# Patient Record
Sex: FEMALE | Race: WHITE | NOT HISPANIC OR LATINO | Employment: PART TIME | URBAN - METROPOLITAN AREA
[De-identification: names, ages, dates, MRNs, and addresses within clinical notes are randomized per-mention and may not be internally consistent; named-entity substitution may affect disease eponyms.]

---

## 2017-06-30 LAB — TSH SERPL-ACNC: 1.19 UIU/ML (ref 0.3–5)

## 2017-12-19 ENCOUNTER — TRANSFERRED RECORDS (OUTPATIENT)
Dept: HEALTH INFORMATION MANAGEMENT | Facility: CLINIC | Age: 57
End: 2017-12-19

## 2017-12-19 LAB — TSH SERPL-ACNC: 1.77 UIU/ML (ref 0.3–5)

## 2018-06-16 ENCOUNTER — HEALTH MAINTENANCE LETTER (OUTPATIENT)
Age: 58
End: 2018-06-16

## 2018-08-27 ENCOUNTER — OFFICE VISIT (OUTPATIENT)
Dept: ENDOCRINOLOGY | Facility: CLINIC | Age: 58
End: 2018-08-27
Payer: COMMERCIAL

## 2018-08-27 VITALS
BODY MASS INDEX: 35.5 KG/M2 | HEIGHT: 70 IN | DIASTOLIC BLOOD PRESSURE: 77 MMHG | WEIGHT: 248 LBS | SYSTOLIC BLOOD PRESSURE: 123 MMHG | HEART RATE: 60 BPM

## 2018-08-27 DIAGNOSIS — E66.09 CLASS 2 OBESITY DUE TO EXCESS CALORIES WITHOUT SERIOUS COMORBIDITY WITH BODY MASS INDEX (BMI) OF 35.0 TO 35.9 IN ADULT: ICD-10-CM

## 2018-08-27 DIAGNOSIS — E89.0 POSTSURGICAL HYPOTHYROIDISM: ICD-10-CM

## 2018-08-27 DIAGNOSIS — E22.0 ACROMEGALY (H): Primary | ICD-10-CM

## 2018-08-27 DIAGNOSIS — E66.812 CLASS 2 OBESITY DUE TO EXCESS CALORIES WITHOUT SERIOUS COMORBIDITY WITH BODY MASS INDEX (BMI) OF 35.0 TO 35.9 IN ADULT: ICD-10-CM

## 2018-08-27 PROBLEM — E78.5 HYPERLIPIDEMIA LDL GOAL <130: Status: ACTIVE | Noted: 2018-08-27

## 2018-08-27 LAB
ANION GAP SERPL CALCULATED.3IONS-SCNC: 7 MMOL/L (ref 3–14)
BUN SERPL-MCNC: 13 MG/DL (ref 7–30)
CALCIUM SERPL-MCNC: 9.4 MG/DL (ref 8.5–10.1)
CHLORIDE SERPL-SCNC: 106 MMOL/L (ref 94–109)
CO2 SERPL-SCNC: 28 MMOL/L (ref 20–32)
CORTIS SERPL-MCNC: 5.2 UG/DL (ref 4–22)
CREAT SERPL-MCNC: 0.82 MG/DL (ref 0.52–1.04)
GFR SERPL CREATININE-BSD FRML MDRD: 72 ML/MIN/1.7M2
GLUCOSE SERPL-MCNC: 101 MG/DL (ref 70–99)
POTASSIUM SERPL-SCNC: 4.3 MMOL/L (ref 3.4–5.3)
PROLACTIN SERPL-MCNC: 10 UG/L (ref 3–27)
SODIUM SERPL-SCNC: 141 MMOL/L (ref 133–144)
T4 FREE SERPL-MCNC: 0.89 NG/DL (ref 0.76–1.46)
TSH SERPL DL<=0.005 MIU/L-ACNC: 1.12 MU/L (ref 0.4–4)

## 2018-08-27 PROCEDURE — 84443 ASSAY THYROID STIM HORMONE: CPT | Performed by: INTERNAL MEDICINE

## 2018-08-27 PROCEDURE — 80048 BASIC METABOLIC PNL TOTAL CA: CPT | Performed by: INTERNAL MEDICINE

## 2018-08-27 PROCEDURE — 82533 TOTAL CORTISOL: CPT | Performed by: INTERNAL MEDICINE

## 2018-08-27 PROCEDURE — 99215 OFFICE O/P EST HI 40 MIN: CPT | Performed by: INTERNAL MEDICINE

## 2018-08-27 PROCEDURE — 84146 ASSAY OF PROLACTIN: CPT | Performed by: INTERNAL MEDICINE

## 2018-08-27 PROCEDURE — 84305 ASSAY OF SOMATOMEDIN: CPT | Performed by: INTERNAL MEDICINE

## 2018-08-27 PROCEDURE — 83003 ASSAY GROWTH HORMONE (HGH): CPT | Performed by: INTERNAL MEDICINE

## 2018-08-27 PROCEDURE — 84439 ASSAY OF FREE THYROXINE: CPT | Performed by: INTERNAL MEDICINE

## 2018-08-27 PROCEDURE — 36415 COLL VENOUS BLD VENIPUNCTURE: CPT | Performed by: INTERNAL MEDICINE

## 2018-08-27 RX ORDER — OMEGA-3-ACID ETHYL ESTERS 1 G/1
1 CAPSULE, LIQUID FILLED ORAL DAILY
COMMUNITY

## 2018-08-27 RX ORDER — DESMOPRESSIN ACETATE 0.1 MG/ML
SOLUTION NASAL
Refills: 0 | COMMUNITY
Start: 2018-07-27 | End: 2018-08-31

## 2018-08-27 RX ORDER — MULTIPLE VITAMINS W/ MINERALS TAB 9MG-400MCG
1 TAB ORAL DAILY
COMMUNITY

## 2018-08-27 RX ORDER — DESMOPRESSIN ACETATE 0.1 MG/ML
SOLUTION NASAL
Refills: 0 | Status: CANCELLED | OUTPATIENT
Start: 2018-08-27

## 2018-08-27 RX ORDER — LANREOTIDE ACETATE 120 MG/.5ML
INJECTION SUBCUTANEOUS
COMMUNITY
Start: 2018-08-21 | End: 2018-09-09

## 2018-08-27 RX ORDER — AMLODIPINE BESYLATE 5 MG/1
5 TABLET ORAL
Refills: 1 | COMMUNITY
Start: 2018-08-07

## 2018-08-27 RX ORDER — LANREOTIDE ACETATE 120 MG/.5ML
INJECTION SUBCUTANEOUS
Qty: 0.28 MG | Status: CANCELLED | OUTPATIENT
Start: 2018-08-27

## 2018-08-27 RX ORDER — ESTRADIOL 0.05 MG/D
PATCH, EXTENDED RELEASE TRANSDERMAL
COMMUNITY
Start: 2018-03-01 | End: 2020-06-12

## 2018-08-27 RX ORDER — ESCITALOPRAM OXALATE 10 MG/1
10 TABLET ORAL DAILY
Refills: 3 | COMMUNITY
Start: 2018-08-07

## 2018-08-27 NOTE — PROGRESS NOTES
Name: Anisa Robles is a 58 year old woman, self referred for evaluation of acromegaly     Chief Complaint   Patient presents with     Consult     Pituitary      HPI:  Recent issues:  Here for f/u endocrinology evaluation.  Feeling well overall but stressors with her elderly mother's health  Patient symptoms with some fatigue, knee aching        Pituitary:  1997. Diagnosis of acromegaly  Lab tests showed high IGF-1 level and head MRI scan showed 2 cm pituitary macroadenoma  Details of initial evaluation and testing not available  10/16/97 TSS surgery at [facility] with Dr. Tod Loving/neurosurgery  Devoloped postop diabetes insipidus (DI)  Postop mild persistent elevation with IGF-1 level, normalized with Octreotide treatment  Has taken Somatuline (somatostatin), ddAVP for treatment  Head MRI imaging includes:  2/10/10 MRI:   Postop changes after TSS   No evidence for recurrent tumor  9/24/13 MRI:   Postop changes following pituitary surgery   Enhancing tissues 0.45x 1.3x 1.4 cm   No clear evidence of recurrent tumor  12/19/16 MRI:   Postop changes   Enhancing tissues 0.65x 0.8x ~1.4 cm  Other imaging:  Pelvis XRays:    Osteochondroma bone changes  5/23/14 Abdominal U/S:   GB unremarkable without cholethiasis or GB wall thickening  3/2010 Colonoscopy:   Polyp removed, benign tissue  3/2015 Colonoscopy:   No polyps reported    Current medications include:   Somatuline 120 mg subcutaneous injections    Previously on Q2 mo cycle    5/2016 Changed to Q3 mo cycle dosing, med shipped directly to [facility]    Last injection was 5/30/18   ddAVP 1-spray each morning and 0-1 sprays each evening   Vivelle estrogen patch weekly per gynecologist    Previous history of JOANN/BSO 10/2007      Thyroid:  History of multinodular goiter  7/2005. Left thyroid lobectomy, [facility]  Path:  Benign adenomas  No evidence for postop hypothyroidism  No previous treatment with thyroid  medication  12/19/17 labs:  IGF1 210 ng/mL (nl ), GH 0.9 ng/mL (nl <7.1), glucose 93 mg/dl, prolactin 6.0 ng/mL, TSH 1.77 uIU/mL, FT4 0.95 ng/dL  3/2/18 labs:  T.chol 251 mg/dl,  mg/dl, TG 86 mg/dl      , lives in [place], 2 children Taj and Olivia.  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 Tod Izaguirre/[facility] for general medicine evaluations.  Also sees Dr. Lucina Bales and also Lucina NAPOLES/[facility]    PMH/PSH:  Past Medical History:   Diagnosis Date     Acromegaly (H)      Breast lump      Cataract      Diabetes insipidus (H)      Kidney infection      Multiple thyroid nodules      TOBY (obstructive sleep apnea)      Osteochondroma      Past Surgical History:   Procedure Laterality Date     HYSTERECTOMY TOTAL ABDOMINAL  10/2007     PARTIAL THYROIDECTOMY Left 07/2005       Family Hx:  No family history on file.      Social Hx:  Social History     Social History     Marital status:      Spouse name: N/A     Number of children: N/A     Years of education: N/A     Occupational History     Not on file.     Social History Main Topics     Smoking status: Never Smoker     Smokeless tobacco: Never Used     Alcohol use Yes     Drug use: No     Sexual activity: Not on file     Other Topics Concern     Not on file     Social History Narrative     No narrative on file          MEDICATIONS:  has a current medication list which includes the following prescription(s): amlodipine, desmopressin, escitalopram, estradiol, multivitamin, therapeutic with minerals, omega-3 acid ethyl esters, somatuline depot, and cholecalciferol.    ROS:     ROS: 10 point ROS neg other than the symptoms noted above in the HPI.    GENERAL:  fatigue, wt stable; denies fevers, chills, night sweats.    HEENT: no dysphagia, odonophagia, diplopia, neck pain  THYROID:  no apparent hyper or hypothyroid symptoms  CV: no chest pain, pressure, palpitations  LUNGS: no  "SOB, HOPPER, cough, wheezing   ABDOMEN: no diarrhea, constipation, abdominal pain  EXTREMITIES: no rashes, ulcers, edema  NEUROLOGY: no headaches, denies changes in vision, tingling, extremitiy numbness   MSK: aches at knees and back; denies muscle weakness  SKIN: no rashes or lesions  :  Increased urination if no ddAVP use; no menses since JOANN/BSO 10/2007  PSYCH:  stable mood, no significant anxiety or depression  ENDOCRINE: no heat or cold intolerance    Physical Exam   VS: /77  Pulse 60  Ht 1.765 m (5' 9.5\")  Wt 112.5 kg (248 lb)  BMI 36.1 kg/m2  GENERAL: AXOX3, NAD, well dressed, answering questions appropriately, appears stated age.  THYROID:  Horizontal anterior neck scar healed, no palpable thyroid or neck nodules  HEENT: prominent facial bone features; neck non-tender, no exopthalmous, no proptosis, EOMI  CV: RRR, no rubs, gallops, no murmurs  LUNGS: CTAB, no wheezes, rales, or ronchi  ABDOMEN: obese, soft, nontender, nondistended  EXTREMITIES: large hands and feet, no pedal edema  NEUROLOGY: CN grossly intact, no tremors  MSK: grossly intact  SKIN: no rashes, no lesions    LABS:    All pertinent notes, labs, and images personally reviewed by me.     A/P:  Encounter Diagnoses   Name Primary?     Acromegaly (H) Yes     Postsurgical hypothyroidism      Class 2 obesity due to excess calories without serious comorbidity with body mass index (BMI) of 35.0 to 35.9 in adult      Comments:   Reviewed health history, pituitary, and thyroid issues.    Plan:  Discussed general issues with acromegaly diagnosis and management  We have reviewed pituitary gland anatomy and hormone physiology  Discussed lab tests used to assess patient pituitary-axis hormone levels  We discussed the previous pituitary MRI imaging procedure  Reviewed treatment options with the somatostatin and ddAVP medication use  Reviewed lab testing to screen for hypopituitarism and hypothyroidism    Recommend:  Reviewed the pituitary disease " management plan  Continue current ddAVP spray BID dosing  Check lab tests today including IGF-1, GH, and thyroid levels  Arrange the usual Somatuline injection dose at [facility] for the 3-month injection dose  Will need updated Somatuline and ddAVP med Rx's soon  No head MRI imaging needed at this time.    She had recent high cholesterol levels measured by her gynecologist  Encouraged patient to contact her GYN or PCP for management advice, statin Rx, or fasting lipid panel recheck    Addressed patient questions today    Labs ordered today:   Orders Placed This Encounter   Procedures     Cortisol     Prolactin     Insulin Growth Factor 1 by Immunoassay     Human growth hormone     TSH     T4 free     Basic metabolic panel     Radiology/Consults ordered today: None    More than 50% of the time spent with Ms. Robles on counseling / coordinating her care.  Total appointment time was 40 minutes.    Follow-up:  6 mo    Adithya De Luna MD  Endocrinology  [facility]  Copy:  Zina SANTIAGO

## 2018-08-27 NOTE — MR AVS SNAPSHOT
"              After Visit Summary   8/27/2018    Anisa Robles    MRN: [id number]           Patient Information     Date Of Birth          1960        Visit Information        Provider Department      8/27/2018 10:30 AM Adithya De Luna MD [facility]        Today's Diagnoses     Acromegaly (H)    -  1    Postsurgical hypothyroidism        Class 2 obesity due to excess calories without serious comorbidity with body mass index (BMI) of 35.0 to 35.9 in adult           Follow-ups after your visit        Follow-up notes from your care team     Return in about 6 months (around 2/27/2019).      Who to contact     If you have questions or need follow up information about today's clinic visit or your schedule please contact [facility] directly at [phone].  Normal or non-critical lab and imaging results will be communicated to you by MyChart, letter or phone within 4 business days after the clinic has received the results. If you do not hear from us within 7 days, please contact the clinic through MyChart or phone. If you have a critical or abnormal lab result, we will notify you by phone as soon as possible.  Submit refill requests through [employer] or call your pharmacy and they will forward the refill request to us. Please allow 3 business days for your refill to be completed.          Additional Information About Your Visit        Care EveryWhere ID     This is your Care EveryWhere ID. This could be used by other organizations to access your [facility] medical records  [id number]        Your Vitals Were     Pulse Height BMI (Body Mass Index)             60 1.765 m (5' 9.5\") 36.1 kg/m2          Blood Pressure from Last 3 Encounters:   08/27/18 123/77    Weight from Last 3 Encounters:   08/27/18 112.5 kg (248 lb)              We Performed the Following     Basic metabolic panel     Cortisol     Human growth hormone     Insulin Growth Factor 1 by Immunoassay     Prolactin     T4 free     " [facility]        Primary Care Provider Office Phone # Fax #    Tdo Izaguirre [phone]       [address] [address]        Equal Access to Services     ABDIRASHID GARDINER : Hadii aad ku hadleesao Solbali, waaxda luqadaha, qaybta kaalmada adeegyada, emily rangelin hayaafaizan macdonaldmadelin desouza fab joe. So [facility] [phone].    ATENCIÓN: Si habla español, tiene a graham disposición servicios gratuitos de asistencia lingüística. Llame al [phone].    We comply with applicable federal civil rights laws and [place] laws. We do not discriminate on the basis of race, color, national origin, age, disability, sex, sexual orientation, or gender identity.            Thank you!     Thank you for choosing [facility]  for your care. Our goal is always to provide you with excellent care. Hearing back from our patients is one way we can continue to improve our services. Please take a few minutes to complete the written survey that you may receive in the mail after your visit with us. Thank you!             Your Updated Medication List - Protect others around you: Learn how to safely use, store and throw away your medicines at www.disposemymeds.org.          This list is accurate as of 8/27/18 11:09 AM.  Always use your most recent med list.                   Brand Name Dispense Instructions for use Diagnosis    amLODIPine 10 MG tablet    NORVASC     TAKE 1 TABLET BY MOUTH DAILY  DUE FOR OFFICE VISIT IN SEPT        desmopressin 0.01 % Soln spray    DDAVP     USE ONE SPRAY NASALLY TWO TIMES A DAY AS DIRECTED        escitalopram 10 MG tablet    LEXAPRO     Take 10 mg by mouth daily        estradiol 0.05 MG/24HR BIW patch    VIVELLE-DOT          Multi-vitamin Tabs tablet      Take 1 tablet by mouth daily        omega-3 acid ethyl esters 1 g capsule    Lovaza     Take 1 g by mouth daily        SOMATULINE DEPOT 120 MG/0.5ML Soln injection   Generic drug:  lanreotide Acetate           VITAMIN D  (CHOLECALCIFEROL) PO      Take 1,000 Units by mouth daily

## 2018-08-28 LAB
GH SERPL-MCNC: 0.7 UG/L
IGF-I BLD-MCNC: 234 NG/ML (ref 48–235)

## 2018-08-31 DIAGNOSIS — E23.2 DI (DIABETES INSIPIDUS) (H): Primary | ICD-10-CM

## 2018-08-31 NOTE — TELEPHONE ENCOUNTER
Needs diagnosis  Medication historical in chart    Pending Prescriptions:                       Disp   Refills    desmopressin (DDAVP) 0.01 % SOLN spray    5 mL   6            Sig: USE ONE SPRAY NASALLY TWO TIMES A DAY AS DIRECTED          Last Written Prescription Date:  historical  Last Fill Quantity: 5mL,   # refills: -  Last Office Visit: 8-27-18 TL  Future Office visit:       Routing refill request to provider for review/approval because:  Medication is reported/historical    RT Jose (R)

## 2018-09-02 RX ORDER — DESMOPRESSIN ACETATE 0.1 MG/ML
SOLUTION NASAL
Qty: 5 ML | Refills: 11 | Status: SHIPPED | OUTPATIENT
Start: 2018-09-02 | End: 2019-09-09

## 2018-09-09 DIAGNOSIS — E22.0 ACROMEGALY (H): Primary | ICD-10-CM

## 2018-09-09 RX ORDER — LANREOTIDE ACETATE 120 MG/.5ML
INJECTION SUBCUTANEOUS
Qty: 120 MG | Refills: 2 | Status: SHIPPED | OUTPATIENT
Start: 2018-09-09 | End: 2019-04-03

## 2018-11-20 ENCOUNTER — TELEPHONE (OUTPATIENT)
Dept: ENDOCRINOLOGY | Facility: CLINIC | Age: 58
End: 2018-11-20

## 2019-03-29 DIAGNOSIS — E22.0 ACROMEGALY (H): ICD-10-CM

## 2019-03-29 NOTE — TELEPHONE ENCOUNTER
Reason for Call:  Medication or medication refill:    Do you use a [facility]?  Name of the pharmacy and phone number for the current request:    [facility]  Ph: [phone]    [facility] [address]   Ph: [phone]    Name of the medication requested: SOMATULINE DEPOT 120 MG/0.5ML SOLN injection     Other request: Pt is hoping to get this injected ASAP at the clinic listed above    Can we leave a detailed message on this number? YES    Phone number patient can be reached at: Cell number on file:    Telephone Information:   Mobile [phone]       Best Time: after 3pm      Call taken on 3/29/2019 at 2:33 PM by Nicole Mccullough      somatline  somatulin

## 2019-04-03 DIAGNOSIS — E22.0 ACROMEGALY (H): ICD-10-CM

## 2019-04-03 RX ORDER — LANREOTIDE ACETATE 120 MG/.5ML
INJECTION SUBCUTANEOUS
Qty: 120 MG | Refills: 2 | Status: SHIPPED | OUTPATIENT
Start: 2019-04-03 | End: 2020-02-28

## 2019-04-03 RX ORDER — LANREOTIDE ACETATE 120 MG/.5ML
INJECTION SUBCUTANEOUS
Qty: 120 MG | Refills: 2 | Status: CANCELLED | OUTPATIENT
Start: 2019-04-03

## 2019-04-03 NOTE — TELEPHONE ENCOUNTER
Patient returning call with fax number for pharmacy.     [facility]     Telephone: [phone]  Fax: [phone]

## 2019-04-03 NOTE — TELEPHONE ENCOUNTER
Patient called to check on the message she placed on 3/29/19 and has not heard back.  Regarding her SOMATULINE DEPOT 120 MG/0.5ML SOLIN injection    Please call pt back with an update

## 2019-04-04 NOTE — TELEPHONE ENCOUNTER
Messages noted.  I never received the original 3/29/19 message, though did receive the Somatuline Rx message today.  I appreciate assistance of our MA to clarify the pharmacy used for the Somatuline Rx.      This Somatuline Rx now efaxed to the [facility], as requested.  I assume patient will contact me if other questions or concerns.    JOSEFINA De Luna MD  Endocrinology   [facility]

## 2019-04-05 ENCOUNTER — OFFICE VISIT (OUTPATIENT)
Dept: ENDOCRINOLOGY | Facility: CLINIC | Age: 59
End: 2019-04-05
Payer: COMMERCIAL

## 2019-04-05 VITALS
WEIGHT: 248 LBS | BODY MASS INDEX: 35.5 KG/M2 | HEIGHT: 70 IN | DIASTOLIC BLOOD PRESSURE: 74 MMHG | SYSTOLIC BLOOD PRESSURE: 123 MMHG | HEART RATE: 57 BPM

## 2019-04-05 DIAGNOSIS — E22.0 ACROMEGALY (H): Primary | ICD-10-CM

## 2019-04-05 DIAGNOSIS — E89.0 POSTSURGICAL HYPOTHYROIDISM: ICD-10-CM

## 2019-04-05 DIAGNOSIS — E66.812 CLASS 2 OBESITY DUE TO EXCESS CALORIES WITHOUT SERIOUS COMORBIDITY WITH BODY MASS INDEX (BMI) OF 35.0 TO 35.9 IN ADULT: ICD-10-CM

## 2019-04-05 DIAGNOSIS — E23.2 DI (DIABETES INSIPIDUS) (H): ICD-10-CM

## 2019-04-05 DIAGNOSIS — E66.09 CLASS 2 OBESITY DUE TO EXCESS CALORIES WITHOUT SERIOUS COMORBIDITY WITH BODY MASS INDEX (BMI) OF 35.0 TO 35.9 IN ADULT: ICD-10-CM

## 2019-04-05 LAB
ANION GAP SERPL CALCULATED.3IONS-SCNC: 7 MMOL/L (ref 3–14)
BUN SERPL-MCNC: 12 MG/DL (ref 7–30)
CALCIUM SERPL-MCNC: 10 MG/DL (ref 8.5–10.1)
CHLORIDE SERPL-SCNC: 108 MMOL/L (ref 94–109)
CO2 SERPL-SCNC: 28 MMOL/L (ref 20–32)
CORTIS SERPL-MCNC: 6.7 UG/DL (ref 4–22)
CREAT SERPL-MCNC: 0.82 MG/DL (ref 0.52–1.04)
GFR SERPL CREATININE-BSD FRML MDRD: 79 ML/MIN/{1.73_M2}
GH SERPL-MCNC: 0.5 UG/L
GLUCOSE SERPL-MCNC: 98 MG/DL (ref 70–99)
IGF-I BLD-MCNC: 256 NG/ML (ref 47–236)
POTASSIUM SERPL-SCNC: 4.3 MMOL/L (ref 3.4–5.3)
PROLACTIN SERPL-MCNC: 8 UG/L (ref 3–27)
SODIUM SERPL-SCNC: 143 MMOL/L (ref 133–144)
T4 FREE SERPL-MCNC: 0.92 NG/DL (ref 0.76–1.46)
TSH SERPL DL<=0.005 MIU/L-ACNC: 1.32 MU/L (ref 0.4–4)

## 2019-04-05 PROCEDURE — 84305 ASSAY OF SOMATOMEDIN: CPT | Performed by: INTERNAL MEDICINE

## 2019-04-05 PROCEDURE — 84146 ASSAY OF PROLACTIN: CPT | Performed by: INTERNAL MEDICINE

## 2019-04-05 PROCEDURE — 36415 COLL VENOUS BLD VENIPUNCTURE: CPT | Performed by: INTERNAL MEDICINE

## 2019-04-05 PROCEDURE — 82533 TOTAL CORTISOL: CPT | Performed by: INTERNAL MEDICINE

## 2019-04-05 PROCEDURE — 84439 ASSAY OF FREE THYROXINE: CPT | Performed by: INTERNAL MEDICINE

## 2019-04-05 PROCEDURE — 84443 ASSAY THYROID STIM HORMONE: CPT | Performed by: INTERNAL MEDICINE

## 2019-04-05 PROCEDURE — 99214 OFFICE O/P EST MOD 30 MIN: CPT | Performed by: INTERNAL MEDICINE

## 2019-04-05 PROCEDURE — 80048 BASIC METABOLIC PNL TOTAL CA: CPT | Performed by: INTERNAL MEDICINE

## 2019-04-05 PROCEDURE — 83003 ASSAY GROWTH HORMONE (HGH): CPT | Performed by: INTERNAL MEDICINE

## 2019-04-05 ASSESSMENT — MIFFLIN-ST. JEOR: SCORE: 1782.23

## 2019-04-05 NOTE — PROGRESS NOTES
Name: Anisa Robles    Chief Complaint   Patient presents with     Endocrine Problem     HPI:  Recent issues:  Here for f/u endocrinology evaluation.  Feeling well overall, some fatigue, knee aching R>L  Her brother  2019 with complications of pancreatic cancer        Pituitary:  . 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 Tod Loving/neurosurgery  10/16/97 TSS pituitary surgery at [facility]    Devoloped postop diabetes insipidus (DI)  Postop mild persistent elevation with IGF-1 level, normalized with Octreotide treatment  Has taken Somatuline (somatostatin), ddAVP for treatment    Head MRI imaging includes:  2/10/10 MRI:   Postop changes after TSS   No evidence for recurrent tumor  13 MRI:   Postop changes following pituitary surgery   Enhancing tissues 0.45x 1.3x 1.4 cm   No clear evidence of recurrent tumor  16 MRI:   Postop changes   Enhancing tissues 0.65x 0.8x ~1.4 cm  Other imaging:  Pelvis XRays:    Osteochondroma bone changes  14 Abdominal U/S:   GB unremarkable without cholethiasis or GB wall thickening  3/2010 Colonoscopy:   Polyp removed, benign tissue  3/2015 Colonoscopy:   No polyps reported    Recent  labs include:  Lab Results   Component Value Date    PROLACTIN 10 2018    HGH 0.7 2018    MARCOS 5.2 2018    TSH 1.12 2018    T4 0.89 2018      Current medications include:   Somatuline 120 mg subcutaneous injections    Previously on Q2 mo cycle    2016 Changed to Q3 mo cycle dosing, med shipped directly to [facility]    Last injection was 18   ddAVP 1-spray each morning and 0-1 sprays each evening   Vivelle estrogen patch weekly per gynecologist    Previous history of JOANN/BSO 10/2007      Thyroid:  History of multinodular goiter  Surgical evaluation at [facility]  7/2005. Left thyroid lobectomy  Path:  Benign adenomas  No evidence for postop hypothyroidism  No previous treatment with thyroid medication  12/19/17 labs:  IGF1 210 ng/mL (nl ), GH 0.9 ng/mL (nl <7.1), glucose 93 mg/dl, prolactin 6.0 ng/mL, TSH 1.77 uIU/mL, FT4 0.95 ng/dL  3/2/18 labs:  T.chol 251 mg/dl,  mg/dl, TG 86 mg/dl  Recent [facility] labs include:  Lab Results   Component Value Date    TSH 1.12 08/27/2018    T4 0.89 08/27/2018         , lives in [place], 2 children Taj and Olivia.  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 Tod Izaguirre/[facility] for general medicine evaluations.  Also sees Dr. Lucina Bales and also Lucina NAPOLES/[facility]    PMH/PSH:  Past Medical History:   Diagnosis Date     Acromegaly (H)      Breast lump      Cataract      Diabetes insipidus (H)      Kidney infection      Multiple thyroid nodules      TOBY (obstructive sleep apnea)      Osteochondroma      Past Surgical History:   Procedure Laterality Date     HYSTERECTOMY TOTAL ABDOMINAL  10/2007     PARTIAL THYROIDECTOMY Left 07/2005       Family Hx:  No family history on file.      Social Hx:  Social History     Socioeconomic History     Marital status:      Spouse name: Not on file     Number of children: Not on file     Years of education: Not on file     Highest education level: Not on file   Occupational History     Not on file   Social Needs     Financial resource strain: Not on file     Food insecurity:     Worry: Not on file     Inability: Not on file     Transportation needs:     Medical: Not on file     Non-medical: Not on file   Tobacco Use     Smoking status: Never Smoker     Smokeless tobacco: Never Used   Substance and Sexual Activity     Alcohol use: Yes     Drug use: No     Sexual activity: Not on file   Lifestyle     Physical activity:     Days per week: Not on file     Minutes per session: Not on file     Stress: Not on  "file   Relationships     Social connections:     Talks on phone: Not on file     Gets together: Not on file     Attends [employer] service: Not on file     Active member of club or organization: Not on file     Attends meetings of clubs or organizations: Not on file     Relationship status: Not on file     Intimate partner violence:     Fear of current or ex partner: Not on file     Emotionally abused: Not on file     Physically abused: Not on file     Forced sexual activity: Not on file   Other Topics Concern     Not on file   Social History Narrative     Not on file          MEDICATIONS:  has a current medication list which includes the following prescription(s): amlodipine, desmopressin, escitalopram, estradiol, multivitamin w/minerals, omega-3 acid ethyl esters, somatuline depot, and cholecalciferol.    ROS:     ROS: 10 point ROS neg other than the symptoms noted above in the HPI.    GENERAL:  fatigue, wt stable; denies fevers, chills, night sweats.    HEENT: no dysphagia, odonophagia, diplopia, neck pain  THYROID:  no apparent hyper or hypothyroid symptoms  CV: no chest pain, pressure, palpitations  LUNGS: no SOB, HOPPER, cough, wheezing   ABDOMEN: no diarrhea, constipation, abdominal pain  EXTREMITIES: no rashes, ulcers, edema  NEUROLOGY: no headaches, denies changes in vision, tingling, extremitiy numbness   MSK: aches at knees and back; denies muscle weakness  SKIN: no rashes or lesions  :  Increased urination if no ddAVP use; no menses since JOANN/BSO 10/2007  PSYCH:  stable mood, no significant anxiety or depression  ENDOCRINE: no heat or cold intolerance    Physical Exam   VS: /74 (BP Location: Right arm, Cuff Size: Adult Large)   Pulse 57   Ht 1.765 m (5' 9.5\")   Wt 112.5 kg (248 lb)   Breastfeeding? No   BMI 36.10 kg/m    GENERAL: AXOX3, NAD, well dressed, answering questions appropriately, appears stated age.  THYROID:  Horizontal anterior neck scar healed, no palpable thyroid or neck " nodules  HEENT: prominent facial bone features; neck non-tender, no exopthalmous, no proptosis, EOMI  CV: RRR, no rubs, gallops, no murmurs  LUNGS: CTAB, no wheezes, rales, or ronchi  ABDOMEN: obese, soft, nontender, nondistended  EXTREMITIES: large hands and feet, no pedal edema  NEUROLOGY: CN grossly intact, no tremors  MSK: grossly intact  SKIN: no rashes, no lesions    LABS:    All pertinent notes, labs, and images personally reviewed by me.     A/P:  Encounter Diagnoses   Name Primary?     Acromegaly (H) Yes     DI (diabetes insipidus) (H)      Postsurgical hypothyroidism      Class 2 obesity due to excess calories without serious comorbidity with body mass index (BMI) of 35.0 to 35.9 in adult      Comments:   Reviewed health history, pituitary, and thyroid issues.    Plan:  Discussed general issues with acromegaly diagnosis and management  We have reviewed pituitary gland anatomy and hormone physiology  Discussed lab tests used to assess patient pituitary-axis hormone levels  We have discussed the previous pituitary MRI imaging procedure  Reviewed treatment options with the somatostatin and ddAVP medication use  Reviewed lab testing to screen for hypopituitarism and hypothyroidism    Recommend:  Reviewed the pituitary disease management plan  Continue current ddAVP spray BID dosing  Check lab tests today including IGF-1, GH, and thyroid levels  Resume the Somatuline injection dose at [facility]    Discussed the recent Somatuline Rx sent to [facility] mail order pharmacy, per request   Message me if this medication not available for dosing at [facility] next week at her appt  No head MRI imaging needed at this time.  Monitor for symptom changes    Keep regular general medicine and GYN medical appointments  Addressed patient questions today    Labs ordered today:   Orders Placed This Encounter   Procedures     Prolactin     Insulin Growth Factor 1 by Immunoassay     Human growth hormone     Cortisol      TSH     T4 free     Basic metabolic panel     Radiology/Consults ordered today: None    More than 50% of the time spent with Ms. Robles on counseling / coordinating her care.  Total appointment time was 30 minutes.    Follow-up:  6 mo    Adithya De Luna MD  Endocrinology  [facility]

## 2019-04-18 ENCOUNTER — TELEPHONE (OUTPATIENT)
Dept: FAMILY MEDICINE | Facility: CLINIC | Age: 59
End: 2019-04-18

## 2019-04-18 ENCOUNTER — TELEPHONE (OUTPATIENT)
Dept: ENDOCRINOLOGY | Facility: CLINIC | Age: 59
End: 2019-04-18

## 2019-04-18 NOTE — TELEPHONE ENCOUNTER
Reason for Call:  Prior Auth     Detailed comments: Spoke with Donnell From [email or web address] pharmacy she is calling to requesting a prior auth for     SOMATULINE DEPOT 120 MG/0.5ML SOLN injection    Ph. [phone]    Fax: [phone]        Call taken on 4/18/2019 at 3:18 PM by Elisa Lawson

## 2019-04-22 NOTE — TELEPHONE ENCOUNTER
Leisa from [facility] pharmacy is calling back regarding the prior auth, please call her at [phone]

## 2019-04-25 NOTE — TELEPHONE ENCOUNTER
Messages noted.  I called [facility] pharmacy today and they said we needed to do another Prior Authorization for the Somatuline medication... The previous one .  I tried to explain that this patient has been on Somatuline injections every 2-months for several years, though was told a new PA needed.    Somatuline Depot (lanreotide) is a synthetic form of somatostatin which inhibits growth hormone secretion and is an effective medication for management of this patient's acromegaly disease.  It can be dosed with deep subcutaneous injections monthly or every other month.  It has controlled her IGF-1 and growth hormone levels and is medically necessary.    I will request a new PA for this patient's medication.  [facility] Fax [phone].    JOSEFINA De Luna MD  Endocrinology  [facility]

## 2019-04-29 ENCOUNTER — TELEPHONE (OUTPATIENT)
Dept: ENDOCRINOLOGY | Facility: CLINIC | Age: 59
End: 2019-04-29

## 2019-04-29 NOTE — TELEPHONE ENCOUNTER
Updated [employer] that prior authorization is still in progress. Will update them once we hear back from [facility].    Dariel Raymundo CMA on 4/29/2019 at 12:41 PM

## 2019-04-29 NOTE — TELEPHONE ENCOUNTER
Reason for Call:  Other Regarding Prior Authorization for medication.     Detailed comments: [employer] called regarding status of prior auth for SOMATULINE DEPOT 120 MG/0.5ML SOLN injection    Phone Number Patient can be reached at: Other phone number: [phone]    Best Time: Anytime     Can we leave a detailed message on this number? YES    Call taken on 4/29/2019 at 9:15 AM by Matthew Granados

## 2019-08-13 ENCOUNTER — TELEPHONE (OUTPATIENT)
Dept: ENDOCRINOLOGY | Facility: CLINIC | Age: 59
End: 2019-08-13

## 2019-08-13 NOTE — TELEPHONE ENCOUNTER
Reason for Call:  Medication or medication refill:    Do you use a [facility]?  Name of the pharmacy and phone number for the current request:  NA    Name of the medication requested: SOMATULINE DEPOT 120 MG/0.5ML SOLN injection     Other request: Alfreda Montilla called trying to get Pt re-enrolled in copay assistance program. Unsure who to connect with. Forms were faxed (fax number confirmed)    Can we leave a detailed message on this number? Not Applicable    Phone number Alfreda Masterss can be reached at: [phone]    Best Time: any    Call taken on 8/13/2019 at 12:36 PM by Nicole Mccullough

## 2019-09-06 ENCOUNTER — TRANSFERRED RECORDS (OUTPATIENT)
Dept: HEALTH INFORMATION MANAGEMENT | Facility: CLINIC | Age: 59
End: 2019-09-06

## 2019-10-11 ENCOUNTER — OFFICE VISIT (OUTPATIENT)
Dept: ENDOCRINOLOGY | Facility: CLINIC | Age: 59
End: 2019-10-11
Payer: COMMERCIAL

## 2019-10-11 VITALS
BODY MASS INDEX: 35.13 KG/M2 | SYSTOLIC BLOOD PRESSURE: 117 MMHG | HEART RATE: 57 BPM | WEIGHT: 245.4 LBS | DIASTOLIC BLOOD PRESSURE: 75 MMHG | HEIGHT: 70 IN

## 2019-10-11 DIAGNOSIS — E66.01 MORBID OBESITY (H): ICD-10-CM

## 2019-10-11 DIAGNOSIS — E89.0 HISTORY OF PARTIAL THYROIDECTOMY: ICD-10-CM

## 2019-10-11 DIAGNOSIS — E22.0 ACROMEGALY (H): Primary | ICD-10-CM

## 2019-10-11 LAB
CORTIS SERPL-MCNC: 4.8 UG/DL (ref 4–22)
GLUCOSE SERPL-MCNC: 105 MG/DL (ref 70–99)
PROLACTIN SERPL-MCNC: 8 UG/L (ref 3–27)
TSH SERPL DL<=0.005 MIU/L-ACNC: 1.46 MU/L (ref 0.4–4)

## 2019-10-11 PROCEDURE — 36415 COLL VENOUS BLD VENIPUNCTURE: CPT | Performed by: INTERNAL MEDICINE

## 2019-10-11 PROCEDURE — 83003 ASSAY GROWTH HORMONE (HGH): CPT | Performed by: INTERNAL MEDICINE

## 2019-10-11 PROCEDURE — 84443 ASSAY THYROID STIM HORMONE: CPT | Performed by: INTERNAL MEDICINE

## 2019-10-11 PROCEDURE — 84146 ASSAY OF PROLACTIN: CPT | Performed by: INTERNAL MEDICINE

## 2019-10-11 PROCEDURE — 84305 ASSAY OF SOMATOMEDIN: CPT | Performed by: INTERNAL MEDICINE

## 2019-10-11 PROCEDURE — 82533 TOTAL CORTISOL: CPT | Performed by: INTERNAL MEDICINE

## 2019-10-11 PROCEDURE — 82947 ASSAY GLUCOSE BLOOD QUANT: CPT | Performed by: INTERNAL MEDICINE

## 2019-10-11 PROCEDURE — 99214 OFFICE O/P EST MOD 30 MIN: CPT | Performed by: INTERNAL MEDICINE

## 2019-10-11 ASSESSMENT — MIFFLIN-ST. JEOR: SCORE: 1770.44

## 2019-10-11 NOTE — PROGRESS NOTES
Name: Anisa Robles    Chief Complaint   Patient presents with     Endocrine Problem     acromegaly, D.I.     HPI:  Recent issues:  Here for f/u endocrinology evaluation.  Feeling well overall, less knee aching   Had chiropractic treatments of the right IT band which helped decrease right knee pain          Pituitary:  1997. 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 Tod Loving/neurosurgery  10/16/97 TSS pituitary surgery at [facility]    Devoloped postop diabetes insipidus (DI)  Postop mild persistent elevation with IGF-1 level, normalized with Octreotide treatment  Has taken Somatuline (somatostatin), ddAVP for treatment    Head MRI imaging includes:  2/10/10 MRI:   Postop changes after TSS   No evidence for recurrent tumor  9/24/13 MRI:   Postop changes following pituitary surgery   Enhancing tissues 0.45x 1.3x 1.4 cm   No clear evidence of recurrent tumor  12/19/16 MRI:   Postop changes   Enhancing tissues 0.65x 0.8x ~1.4 cm    Other imaging:  Pelvis XRays:    Osteochondroma bone changes  5/23/14 Abdominal U/S:   GB unremarkable without cholethiasis or GB wall thickening  3/2010 Colonoscopy:   Polyp removed, benign tissue  3/2015 Colonoscopy:   No polyps reported    Recent  labs include:  Lab Results   Component Value Date    PROLACTIN 8 04/05/2019    HGH 0.5 04/05/2019    MARCOS 6.7 04/05/2019    TSH 1.32 04/05/2019    T4 0.92 04/05/2019      Current medications include:   Somatuline 120 mg subcutaneous injections to hip area    Previously on Q2 mo cycle    5/2016 Changed to Q3 mo cycle dosing, med shipped directly to [facility]    Last injection was 9/3/19   ddAVP 1-spray each morning and 0-1 sprays each evening   Vivelle estrogen patch weekly per gynecologist    Previous history of JOANN/BSO 10/2007    Planning to stop the estrogen patch in  late 10/2019      Thyroid:  History of multinodular goiter  Surgical evaluation at [facility]  7/2005. Left thyroid lobectomy  Path:  Benign adenomas  No evidence for postop hypothyroidism  No previous treatment with thyroid medication    12/19/17 labs:  IGF1 210 ng/mL (nl ), GH 0.9 ng/mL (nl <7.1), glucose 93 mg/dl, prolactin 6.0 ng/mL, TSH 1.77 uIU/mL, FT4 0.95 ng/dL  3/2/18 labs:  T.chol 251 mg/dl,  mg/dl, TG 86 mg/dl    Recent [facility] labs include:  Lab Results   Component Value Date    TSH 1.32 04/05/2019    T4 0.92 04/05/2019         , lives in [place], 2 children Taj and Olivia.  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 Tod Izaguirre/[facility] for general medicine evaluations.  Also sees Dr. Lucina Bales and also Lucina NAPOLES/[facility]    PMH/PSH:  Past Medical History:   Diagnosis Date     Acromegaly (H)      Breast lump      Cataract      Diabetes insipidus (H)      Kidney infection      Multiple thyroid nodules      TOBY (obstructive sleep apnea)      Osteochondroma      Past Surgical History:   Procedure Laterality Date     HYSTERECTOMY TOTAL ABDOMINAL  10/2007     PARTIAL THYROIDECTOMY Left 07/2005     PITUITARY SURGERY  10/1997    TSS pituitary surgery       Family Hx:  No family history on file.      Social Hx:  Social History     Socioeconomic History     Marital status:      Spouse name: Not on file     Number of children: Not on file     Years of education: Not on file     Highest education level: Not on file   Occupational History     Not on file   Social Needs     Financial resource strain: Not on file     Food insecurity:     Worry: Not on file     Inability: Not on file     Transportation needs:     Medical: Not on file     Non-medical: Not on file   Tobacco Use     Smoking status: Never Smoker     Smokeless tobacco: Never Used   Substance and Sexual Activity     Alcohol use: Yes     Drug use: No  "    Sexual activity: Not on file   Lifestyle     Physical activity:     Days per week: Not on file     Minutes per session: Not on file     Stress: Not on file   Relationships     Social connections:     Talks on phone: Not on file     Gets together: Not on file     Attends [employer] service: Not on file     Active member of club or organization: Not on file     Attends meetings of clubs or organizations: Not on file     Relationship status: Not on file     Intimate partner violence:     Fear of current or ex partner: Not on file     Emotionally abused: Not on file     Physically abused: Not on file     Forced sexual activity: Not on file   Other Topics Concern     Not on file   Social History Narrative     Not on file          MEDICATIONS:  has a current medication list which includes the following prescription(s): amlodipine, desmopressin, escitalopram, estradiol, multivitamin w/minerals, omega-3 acid ethyl esters, somatuline depot, and cholecalciferol.    ROS:     ROS: 10 point ROS neg other than the symptoms noted above in the HPI.    GENERAL:  fatigue, wt stable; denies fevers, chills, night sweats.    HEENT: no dysphagia, odonophagia, diplopia, neck pain  THYROID:  no apparent hyper or hypothyroid symptoms  CV: no chest pain, pressure, palpitations  LUNGS: no SOB, HOPPER, cough, wheezing   ABDOMEN: no diarrhea, constipation, abdominal pain  EXTREMITIES: no rashes, ulcers, edema  NEUROLOGY: no headaches, denies changes in vision, tingling, extremitiy numbness   MSK: aches at knees and back; denies muscle weakness  SKIN: no rashes or lesions  :  Increased urination if no ddAVP use; no menses since JOANN/BSO 10/2007  PSYCH:  stable mood, no significant anxiety or depression  ENDOCRINE: no heat or cold intolerance    Physical Exam   VS: /75   Pulse 57   Ht 1.765 m (5' 9.5\")   Wt 111.3 kg (245 lb 6.4 oz)   BMI 35.72 kg/m    GENERAL: AXOX3, NAD, well dressed, answering questions appropriately, appears stated " age.  THYROID:  Horizontal anterior neck scar healed, no palpable thyroid or neck nodules  HEENT: prominent facial bone features; neck non-tender, no exopthalmous, no proptosis, EOMI  CV: RRR, no rubs, gallops, no murmurs  LUNGS: CTAB, no wheezes, rales, or ronchi  ABDOMEN: obese, soft, nontender, nondistended  EXTREMITIES: large hands and feet, small subcutaneous nodule at distal phalanx of few fingers; no pedal edema  NEUROLOGY: CN grossly intact, no tremors  MSK: grossly intact  SKIN: no rashes, no lesions    LABS:    All pertinent notes, labs, and images personally reviewed by me.     A/P:  Encounter Diagnoses   Name Primary?     Acromegaly (H) Yes     History of partial thyroidectomy      Morbid obesity (H)      Comments:   Reviewed health history, pituitary, and thyroid issues.    Plan:  Discussed general issues with acromegaly diagnosis and management  We have reviewed pituitary gland anatomy and hormone physiology  Discussed lab tests used to assess patient pituitary-axis hormone levels  We have discussed the previous pituitary MRI imaging procedure  Reviewed treatment options with the somatostatin and ddAVP medication use  Reviewed lab testing to screen for hypopituitarism and hypothyroidism    Recommend:  Reviewed the pituitary disease management plan  Continue current ddAVP spray BID dosing  Check lab tests today including IGF-1, GH, and thyroid levels  Continue the Somatuline 120 mg Q 3 month injection dosing at [facility]   No head MRI imaging needed at this time.  Monitor for symptom changes    Keep regular general medicine and GYN medical appointments  Addressed patient questions today    Labs ordered today:   Orders Placed This Encounter   Procedures     TSH     Prolactin     Insulin Growth Factor 1 by Immunoassay     Human growth hormone     Cortisol     Glucose     Radiology/Consults ordered today: None    More than 50% of the time spent with Mrs. Robles on counseling / coordinating her  care.  Total appointment time was 25 minutes.    Follow-up:  6 mo    VARGAS De Luna MD, MS  Endocrinology  [facility]

## 2019-10-14 LAB — GH SERPL-MCNC: 0.7 UG/L

## 2019-10-15 LAB — IGF-I BLD-MCNC: 308 NG/ML (ref 47–236)

## 2020-03-05 ENCOUNTER — TELEPHONE (OUTPATIENT)
Dept: ENDOCRINOLOGY | Facility: CLINIC | Age: 60
End: 2020-03-05

## 2020-03-05 DIAGNOSIS — E22.0 ACROMEGALY (H): ICD-10-CM

## 2020-03-05 NOTE — TELEPHONE ENCOUNTER
Fax received from [employer] [facility] [employer] wanting clarification on directions    Note from pharmacy:  somatuline depot 120mg is usually injected once monthly, please clarify the directions,including the frequency of injections     Disp Refills Start End UBALDO   SOMATULINE DEPOT 120 MG/0.5ML SOLN injection 120 mg 3 2/28/2020  Yes   Sig: Inject subcutaneous every 3 months, as directed

## 2020-03-06 NOTE — TELEPHONE ENCOUNTER
Patient called to see where this was at. She said at her last appointment it was discussed for her to have it every 8 weeks. She is just waiting for it to be renewed      Ph [phone]

## 2020-03-18 NOTE — TELEPHONE ENCOUNTER
Dr. De Luna-     Can you please confirm recommending dosing of   SOMATULINE DEPOT 120 MG/0.5ML SOLN injection 120      Pt reports dose increased to every 8 weeks at last OV. Notes indicated every 3 months    Please advise,   Thank you,   Gosia ANTONY RN

## 2020-03-21 RX ORDER — LANREOTIDE ACETATE 120 MG/.5ML
INJECTION SUBCUTANEOUS
Qty: 120 MG | Refills: 6 | Status: SHIPPED | OUTPATIENT
Start: 2020-03-21 | End: 2020-06-12

## 2020-03-21 NOTE — TELEPHONE ENCOUNTER
Message noted.  This patient has been on the Somatuline 120 mg injections for several years.  In 2019, she was getting the (deep subcutaneous) injections every 3-months (last injection 12/2019) but I advised a change in routine after the last lab testing... change to injections every 2- months.    I called her today and discussed this topic.  I advised resuming the 2-month injection cycle, have now updated the Somatuline Rx to her [facility] mail order pharmacy, plan next injection at her local [facility] clinic soon and repeat in 5/2020 with this 2-month routine.  She agreed, thanked me for the call.    VARGAS De Luna MD, MS  Endocrinology  [facility]

## 2020-06-12 ENCOUNTER — VIRTUAL VISIT (OUTPATIENT)
Dept: ENDOCRINOLOGY | Facility: CLINIC | Age: 60
End: 2020-06-12
Payer: COMMERCIAL

## 2020-06-12 VITALS — WEIGHT: 245 LBS | BODY MASS INDEX: 36.29 KG/M2 | HEIGHT: 69 IN

## 2020-06-12 DIAGNOSIS — E22.0 ACROMEGALY (H): Primary | ICD-10-CM

## 2020-06-12 DIAGNOSIS — E89.0 HISTORY OF PARTIAL THYROIDECTOMY: ICD-10-CM

## 2020-06-12 DIAGNOSIS — E23.2 DI (DIABETES INSIPIDUS) (H): ICD-10-CM

## 2020-06-12 PROCEDURE — 99213 OFFICE O/P EST LOW 20 MIN: CPT | Mod: TEL | Performed by: INTERNAL MEDICINE

## 2020-06-12 RX ORDER — DESMOPRESSIN ACETATE 0.1 MG/ML
SOLUTION NASAL
Qty: 5 ML | Refills: 11 | Status: SHIPPED | OUTPATIENT
Start: 2020-06-12 | End: 2021-06-28

## 2020-06-12 RX ORDER — LANREOTIDE ACETATE 120 MG/.5ML
INJECTION SUBCUTANEOUS
Qty: 120 MG | Refills: 6 | Status: SHIPPED | OUTPATIENT
Start: 2020-06-12 | End: 2021-02-19

## 2020-06-12 ASSESSMENT — MIFFLIN-ST. JEOR: SCORE: 1755.69

## 2020-06-12 NOTE — PROGRESS NOTES
"Anisa Robles is a 60 year old female who is being evaluated via a billable telephone visit.      The patient has been notified of following:     \"This telephone visit will be conducted via a call between you and your physician/provider. We have found that certain health care needs can be provided without the need for a physical exam.  This service lets us provide the care you need with a short phone conversation.  If a prescription is necessary we can send it directly to your pharmacy.  If lab work is needed we can place an order for that and you can then stop by our lab to have the test done at a later time.    Telephone visits are billed at different rates depending on your insurance coverage. During this emergency period, for some insurers they may be billed the same as an in-person visit.  Please reach out to your insurance provider with any questions.    If during the course of the call the physician/provider feels a telephone visit is not appropriate, you will not be charged for this service.\"    Patient has given verbal consent for Telephone visit?  Yes    What phone number would you like to be contacted at? [phone]    How would you like to obtain your AVS? Reviewed verbally      Recent issues:  Endocrinology followup evaluation.  Discontinued the Vivelle patch per GYN last fall  Feeling well overall, less knee aching           Pituitary:  1997. 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 Tod Loving/neurosurgery  10/16/97 TSS pituitary surgery at [facility]     Devoloped postop diabetes insipidus (DI)  Postop mild persistent elevation with IGF-1 level, normalized with Octreotide treatment  Has taken Somatuline (somatostatin), ddAVP for treatment     Head MRI imaging includes:  2/10/10 MRI:              Postop changes after TSS           "    No evidence for recurrent tumor  9/24/13 MRI:              Postop changes following pituitary surgery              Enhancing tissues 0.45x 1.3x 1.4 cm              No clear evidence of recurrent tumor  12/19/16 MRI:              Postop changes              Enhancing tissues 0.65x 0.8x ~1.4 cm     Other imaging:  Pelvis XRays:               Osteochondroma bone changes  5/23/14 Abdominal U/S:              GB unremarkable without cholethiasis or GB wall thickening  3/2010 Colonoscopy:              Polyp removed, benign tissue  3/2015 Colonoscopy:              No polyps reported     ~10/2019. Vivelle estrogen patch discontinued  Recent [facility] labs include:  Lab Results   Component Value Date    PROLACTIN 8 10/11/2019    HGH 0.7 10/11/2019    MARCOS 4.8 10/11/2019    TSH 1.46 10/11/2019    T4 0.92 04/05/2019        Current medications include:              Somatuline 120 mg subcutaneous injections to hip area, every Q2 mo cycle    Last dose early 6/2020              ddAVP 1-spray each morning and 0-1 sprays each evening        Thyroid:  History of multinodular goiter  Surgical evaluation at [facility]  7/2005. Left thyroid lobectomy  Path:  Benign adenomas  No evidence for postop hypothyroidism  No previous treatment with thyroid medication     12/19/17 labs:  IGF1 210 ng/mL (nl ), GH 0.9 ng/mL (nl <7.1), glucose 93 mg/dl, prolactin 6.0 ng/mL, TSH 1.77 uIU/mL, FT4 0.95 ng/dL  3/2/18 labs:  T.chol 251 mg/dl,  mg/dl, TG 86 mg/dl     Recent [facility] labs include:  Lab Results   Component Value Date    TSH 1.46 10/11/2019    T4 0.92 04/05/2019           , lives in [place], 2 children Taj and Olivia.  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 Tod Izaguirre/[facility] for general medicine evaluations.  Also sees Dr. Lucina Bales and also Lucina NAPOLES/[facility]        ROS: 10 point ROS neg other than the symptoms noted above in the  HPI.     GENERAL:  fatigue, wt stable; denies fevers, chills, night sweats.    HEENT: no dysphagia, odonophagia, diplopia, neck pain  THYROID:  no apparent hyper or hypothyroid symptoms  CV: no chest pain, pressure, palpitations  LUNGS: no SOB, HOPPER, cough, wheezing   ABDOMEN: no diarrhea, constipation, abdominal pain  EXTREMITIES: no rashes, ulcers, edema  NEUROLOGY: no headaches, denies changes in vision, tingling, extremitiy numbness   MSK: aches at knees and back; denies muscle weakness  SKIN: no rashes or lesions  :  Increased urination if no ddAVP use; no menses since JOANN/BSO 10/2007  PSYCH:  stable mood, no significant anxiety or depression  ENDOCRINE: no heat or cold intolerance        LABS:     All pertinent notes, labs, and images personally reviewed by me.      A/P:       Encounter Diagnoses   Name      Acromegaly (H)      History of partial thyroidectomy      Morbid obesity (H)       Comments:   Reviewed health history, pituitary, and thyroid issues.     Plan:  Discussed general issues with acromegaly diagnosis and management  We have reviewed pituitary gland anatomy and hormone physiology  Discussed lab tests used to assess patient pituitary-axis hormone levels  We have discussed the previous pituitary MRI imaging procedure  Reviewed treatment options with the somatostatin and ddAVP medication use  Reviewed lab testing to screen for hypopituitarism and hypothyroidism     Recommend:  Reviewed the pituitary disease management plan  Continue current ddAVP spray BID dosing  Plan repeat [place] lab testing at next appt in 10/2020  Continue the Somatuline 120 mg Q 3 month injection dosing at [facility]   No head MRI imaging needed at this time.  Monitor for symptom changes     Keep regular general medicine and GYN medical appointments  Addressed patient questions today       Total time of call between patient and provider was 16 minutes     This telephone visit chart note is the equivalent of a  39937 office visit billing code      VARGAS De Luna MD, MS  Endocrinology  [facility]

## 2020-08-07 ENCOUNTER — TRANSFERRED RECORDS (OUTPATIENT)
Dept: HEALTH INFORMATION MANAGEMENT | Facility: CLINIC | Age: 60
End: 2020-08-07

## 2020-09-30 ENCOUNTER — TELEPHONE (OUTPATIENT)
Dept: ENDOCRINOLOGY | Facility: CLINIC | Age: 60
End: 2020-09-30

## 2020-09-30 NOTE — TELEPHONE ENCOUNTER
,     Called pt to change appt to virtual.    Pt would like to cancel - says appt was for lab work but since she's not able to come in, she'd rather cancel.    Pt asking for lab orders - willing to drive down to [place] to complete this since she is due.    Please advise.    Thanks!    Ok to leave detailed voice mail.    Vera Luque MA

## 2020-10-01 DIAGNOSIS — E22.0 ACROMEGALY (H): Primary | ICD-10-CM

## 2020-10-01 DIAGNOSIS — E89.0 POSTSURGICAL HYPOTHYROIDISM: ICD-10-CM

## 2020-10-02 DIAGNOSIS — E89.0 POSTSURGICAL HYPOTHYROIDISM: ICD-10-CM

## 2020-10-02 DIAGNOSIS — E22.0 ACROMEGALY (H): ICD-10-CM

## 2020-10-02 LAB
CORTIS SERPL-MCNC: 5.5 UG/DL (ref 4–22)
PROLACTIN SERPL-MCNC: 7 UG/L (ref 3–27)
T4 FREE SERPL-MCNC: 0.93 NG/DL (ref 0.76–1.46)
TSH SERPL DL<=0.005 MIU/L-ACNC: 1.15 MU/L (ref 0.4–4)

## 2020-10-02 PROCEDURE — 82533 TOTAL CORTISOL: CPT | Performed by: INTERNAL MEDICINE

## 2020-10-02 PROCEDURE — 84146 ASSAY OF PROLACTIN: CPT | Performed by: INTERNAL MEDICINE

## 2020-10-02 PROCEDURE — 83003 ASSAY GROWTH HORMONE (HGH): CPT | Performed by: INTERNAL MEDICINE

## 2020-10-02 PROCEDURE — 84305 ASSAY OF SOMATOMEDIN: CPT | Performed by: INTERNAL MEDICINE

## 2020-10-02 PROCEDURE — 36415 COLL VENOUS BLD VENIPUNCTURE: CPT | Performed by: INTERNAL MEDICINE

## 2020-10-02 PROCEDURE — 84443 ASSAY THYROID STIM HORMONE: CPT | Performed by: INTERNAL MEDICINE

## 2020-10-02 PROCEDURE — 84439 ASSAY OF FREE THYROXINE: CPT | Performed by: INTERNAL MEDICINE

## 2020-10-02 NOTE — TELEPHONE ENCOUNTER
Message noted.  OK for patient to arrange a lab appointment at nearest [facility].  Lab orders placed, please relay message.    VARGAS De Luna MD, MS  Endocrinology  [facility]         Never smoker

## 2020-10-06 LAB
GH SERPL-MCNC: 0.5 UG/L
IGF-I BLD-MCNC: 165 NG/ML (ref 46–238)

## 2021-01-12 ENCOUNTER — TRANSFERRED RECORDS (OUTPATIENT)
Dept: HEALTH INFORMATION MANAGEMENT | Facility: CLINIC | Age: 61
End: 2021-01-12

## 2021-02-18 DIAGNOSIS — E22.0 ACROMEGALY (H): ICD-10-CM

## 2021-02-18 NOTE — TELEPHONE ENCOUNTER
SOMATULINE DEPOT 120 MG/0.5ML SOLN injection 120 mg 6 6/12/2020         Last Written Prescription Date:  6/12/2020  Last Fill Quantity: 120 mg,   # refills: 6  Last Office Visit: 6/12/2020  Future Office visit:    Next 5 appointments (look out 90 days)    Apr 09, 2021 10:30 AM  Return Visit with Adithya De Luna MD  [facility] ([facility] [address] [place] [id number] [phone]           Routing refill request to provider for review/approval because:  Drug not on the [facility], [facility] or [facility] refill protocol or controlled substance

## 2021-02-19 RX ORDER — LANREOTIDE ACETATE 120 MG/.5ML
INJECTION SUBCUTANEOUS
Qty: 0.5 ML | Refills: 2 | Status: SHIPPED | OUTPATIENT
Start: 2021-02-19 | End: 2021-09-20

## 2021-05-04 ENCOUNTER — TELEPHONE (OUTPATIENT)
Dept: ENDOCRINOLOGY | Facility: CLINIC | Age: 61
End: 2021-05-04

## 2021-05-04 NOTE — TELEPHONE ENCOUNTER
Specialty mail pharmacy called for a verbal on Somatuline.  Unable to answer all questions, so they will fax form to [phone] as well  [facility] - [address]    Call back if needed:   [phone]

## 2021-06-17 NOTE — TELEPHONE ENCOUNTER
[facility] Call Center    Phone Message    May a detailed message be left on voicemail: yes     Reason for Call: Form or Letter   Type or form/letter needing completion: Prior Authorization  Provider: Calixto  Date form needed: ASAP  Once completed:   Per Shahzad, prior authorization on 5/6/21 was denied due to lack of information, per [facility] pharmacy. Shahzad stated a new PA can be completed on covermymeds.[email or web address] website.  Please complete PA    Action Taken: Message routed to:  Other: Endo    Travel Screening: Not Applicable

## 2021-06-18 NOTE — TELEPHONE ENCOUNTER
Prior Authorization Approval    Authorization Effective Date: 5/19/2021  Authorization Expiration Date: 6/18/2022  Medication: SOMATULINE DEPOT 120 MG/0.5ML SOLN injection  Approved Dose/Quantity: 0.5ml  Reference #: [id number]   Insurance Company: [employer] - Phone [phone] Fax [phone]  Which Pharmacy is filling the prescription (Not needed for infusion/clinic administered): [address]  Pharmacy Notified: Yes **Instructed pharmacy to notify patient when script is ready to /ship.**      Approved with dates 05/19/21-06/18/22, case# [id number]. Will document approval letter once received.

## 2021-06-18 NOTE — TELEPHONE ENCOUNTER
PA Initiation    Medication: SOMATULINE DEPOT 120 MG/0.5ML SOLN injection  Insurance Company: [employer] - Phone [phone] Fax [phone]  Pharmacy Filling the Rx: [facility] [address]  Filling Pharmacy Phone: [phone]  Filling Pharmacy Fax:    Start Date: 6/18/2021    Central Prior Authorization Team   Phone: [phone]      Called [employer] at [phone] to initiate PA over the phone as per covermymeds is coming back stating this has been denied. Was able to answer questions over the phone, and was able to get an approval.

## 2021-06-25 DIAGNOSIS — E23.2 DI (DIABETES INSIPIDUS) (H): ICD-10-CM

## 2021-06-28 RX ORDER — DESMOPRESSIN ACETATE 0.1 MG/ML
SOLUTION NASAL
Qty: 5 ML | Refills: 1 | Status: SHIPPED | OUTPATIENT
Start: 2021-06-28 | End: 2021-09-07

## 2021-09-20 ENCOUNTER — OFFICE VISIT (OUTPATIENT)
Dept: ENDOCRINOLOGY | Facility: CLINIC | Age: 61
End: 2021-09-20
Payer: COMMERCIAL

## 2021-09-20 VITALS
WEIGHT: 238 LBS | BODY MASS INDEX: 35.15 KG/M2 | SYSTOLIC BLOOD PRESSURE: 116 MMHG | DIASTOLIC BLOOD PRESSURE: 77 MMHG | HEART RATE: 68 BPM

## 2021-09-20 DIAGNOSIS — E23.2 DI (DIABETES INSIPIDUS) (H): ICD-10-CM

## 2021-09-20 DIAGNOSIS — E89.0 HISTORY OF PARTIAL THYROIDECTOMY: ICD-10-CM

## 2021-09-20 DIAGNOSIS — E22.0 ACROMEGALY (H): Primary | ICD-10-CM

## 2021-09-20 DIAGNOSIS — L65.9 HAIR THINNING: ICD-10-CM

## 2021-09-20 DIAGNOSIS — E22.0 ACROMEGALY (H): ICD-10-CM

## 2021-09-20 PROCEDURE — 99214 OFFICE O/P EST MOD 30 MIN: CPT | Performed by: INTERNAL MEDICINE

## 2021-09-20 RX ORDER — LANREOTIDE ACETATE 120 MG/.5ML
INJECTION SUBCUTANEOUS
Qty: 0.5 ML | Refills: 6 | Status: SHIPPED | OUTPATIENT
Start: 2021-09-20 | End: 2021-09-25

## 2021-09-20 RX ORDER — DESMOPRESSIN ACETATE 0.1 MG/ML
SOLUTION NASAL
Qty: 5 ML | Refills: 5 | Status: SHIPPED | OUTPATIENT
Start: 2021-09-20 | End: 2022-08-08

## 2021-09-20 NOTE — TELEPHONE ENCOUNTER
Last Written Prescription Date:  2/19/2021  Last Fill Quantity: 0.5mg, # refills: 2   Last office visit:  with prescribing provider:  Dr. De Luna   Future Office Visit: 6/12/2020  Next 5 appointments (look out 90 days)    Sep 20, 2021 11:30 AM  Return Visit with Adithya De Luna MD  [facility] ([facility] ) [address] [place] [address] [id number] [phone]         Requested Prescriptions   Pending Prescriptions Disp Refills     SOMATULINE DEPOT 120 MG/0.5ML SOLN injection [Pharmacy Med Name: SOMATULINE DEP PRFL SYR 0.5ML 120MG] 0.5 mL 6     Sig: INJECT 120 MG UNDER THE SKIN EVERY 2 MONTHS AS DIRECTED       There is no refill protocol information for this order

## 2021-09-20 NOTE — LETTER
9/20/2021         RE: Anisa Robles  [id number] [facility] [address]        Dear Colleague,    Thank you for referring your patient, Anisa Robles, to the [facility] [place]. Please see a copy of my visit note below.      Recent issues:  Endocrinology followup evaluation, last appt 6/2020  Right hip replacement 1/2021, but aching at knees, hips, back areas  2/2021 leg DVT's and bilateral PE's, then Xarelto use for 3 months  7/2021. Vertigo symptoms           Pituitary:  1997. 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 Tod Loving/neurosurgery  10/16/97 TSS pituitary surgery at [facility]     Devoloped postop diabetes insipidus (DI)  Postop mild persistent elevation with IGF-1 level, normalized with Octreotide treatment  Has taken Somatuline (somatostatin), ddAVP for treatment     Head MRI imaging includes:  2/10/10 MRI:              Postop changes after TSS              No evidence for recurrent tumor  9/24/13 MRI:              Postop changes following pituitary surgery              Enhancing tissues 0.45x 1.3x 1.4 cm              No clear evidence of recurrent tumor  12/19/16 MRI:              Postop changes              Enhancing tissues 0.65x 0.8x ~1.4 cm     Other imaging:  Pelvis XRays:               Osteochondroma bone changes  5/23/14 Abdominal U/S:              GB unremarkable without cholethiasis or GB wall thickening  3/2010 Colonoscopy:              Polyp removed, benign tissue  3/2015 Colonoscopy:              No polyps reported     ~10/2019. Vivelle estrogen patch discontinued  Had seen Dr. Tod Izaguirre/[facility]      Previous IGF-1 levels include:  8/27/21 234 ng/mL (nl )  4/5/19  256  10/11/9 308  10/2/20 165    Recent  labs include:  Lab Results   Component Value Date     PROLACTIN 7 10/02/2020    HGH 0.5 10/02/2020    MARCOS 5.5 10/02/2020    TSH 1.15 10/02/2020    T4 0.93 10/02/2020        Current medications include:              Somatuline 120 mg subcutaneous injections to hip area, every Q2 mo cycle    Last dose 7/27/21              ddAVP 1-spray each morning and 0-1 sprays each evening        Thyroid:  History of multinodular goiter  Surgical evaluation at [facility]  7/2005. Left thyroid lobectomy  Path:  Benign adenomas  No evidence for postop hypothyroidism  No previous treatment with thyroid medication    2/24/21 Thyroid U/S:   Right lobe 5.3 x 3.1 x 3 cm and left lobe surgically absent   Several small (<1 cm) nodules throughout right thyroid lobe    12/19/17 labs:  IGF1 210 ng/mL (nl ), GH 0.9 ng/mL (nl <7.1), glucose 93 mg/dl, prolactin 6.0 ng/mL, TSH 1.77 uIU/mL, FT4 0.95 ng/dL     Recent [facility] labs include:  Lab Results   Component Value Date    TSH 1.15 10/02/2020    T4 0.93 10/02/2020           , lives in [place], 2 children Taj and Olivia.  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 IZAIAH Anderson/[facility] for general medicine evaluations.  Also sees Dr. Lucina Bales and also Lucina NAPOLES/[facility]        ROS: 10 point ROS neg other than the symptoms noted above in the HPI.     GENERAL:  fatigue, wt stable; denies fevers, chills, night sweats.    HEENT: no dysphagia, odonophagia, diplopia, neck pain  THYROID:  no apparent hyper or hypothyroid symptoms  CV: no chest pain, pressure, palpitations  LUNGS: no SOB, HOPPER, cough, wheezing   ABDOMEN: no diarrhea, constipation, abdominal pain  EXTREMITIES: no rashes, ulcers, edema  NEUROLOGY: no headaches, denies changes in vision, tingling, extremitiy numbness   MSK: aches at knees and back; denies muscle weakness  SKIN: no rashes or lesions  :  Increased urination if no ddAVP use; no menses since JOANN/BSO 10/2007  PSYCH:  stable mood, no significant  anxiety or depression  ENDOCRINE: no heat or cold intolerance    Physical Exam   VS: /77   Pulse 68   Wt 108 kg (238 lb)   BMI 35.15 kg/m    GENERAL: AXOX3, NAD, well dressed, answering questions appropriately, appears stated age.  ENT: prominent mandible and facial bones; no nose swelling or nasal discharge, mouth redness or gum changes.  EYES: eyes grossly normal to inspection, conjunctivae and sclerae normal, no exophthalmos or proptosis  THYROID:  Low-horizontal anterior neck scar; no apparent nodules or goiter  CV:  RRR without murmurs  LUNGS: no audible wheeze, cough or visible cyanosis, or increased work of breathing  ABDOMEN: abdomen obese size  EXTREMITIES: large hands; no edema noted  NEUROLOGY: CN grossly intact, no tremors  MSK: grossly intact  SKIN:  scalp hair thinning; no apparent skin lesions, rash, or edema with visualized skin appearance  PSYCH: mentation appears normal, affect normal/bright, judgement and insight intact,   normal speech and appearance well groomed     LABS:     All pertinent notes, labs, and images personally reviewed by me.     A/P:  Encounter Diagnoses   Name Primary?     Acromegaly (H) Yes     DI (diabetes insipidus) (H)      History of partial thyroidectomy      Hair thinning      Comments:   Reviewed health history, pituitary, and thyroid issues.  Difficult to understand the Somatuline treatment at her OBGYN clinic without medical records  Reviewed and interpreted tests that I previously ordered.   Ordered appropriate tests for the endocrinology disease management.    Management options discussed and implemented after shared medical decision making with the patient.  Acromegaly problem is chronic-stable     Plan:  Discussed general issues with acromegaly diagnosis and management  We have reviewed pituitary gland anatomy and hormone physiology  Discussed lab tests used to assess patient pituitary-axis hormone levels  We have discussed the previous pituitary MRI  imaging procedure  Reviewed treatment options with the somatostatin and ddAVP medication use  Reviewed lab testing to screen for hypopituitarism and hypothyroidism     Recommend:  Reviewed the pituitary disease management plan  Continue current ddAVP spray BID dosing  Continue the Somatuline 120 mg Q 3 month injection dosing at [facility]   If significant rise in IGF-1 level, change to 120 mg Q 2 months   Needs repeat endocrine lab testing   Recommended nearest [facility], but she prefers [facility] or [facility]   Lab order forms given to patient, diagnoses and our fax number added  No head MRI imaging needed at this time.  Monitor for symptom changes  Will summarize results and recommendations soon     Keep regular general medicine, orthopedic, and GYN appointments  Addressed patient questions today     There are no Patient Instructions on file for this visit.    Future labs ordered today:   Orders Placed This Encounter   Procedures     Testosterone total     Hemoglobin A1c     Basic metabolic panel     TSH     T4 free     Insulin Growth Factor 1 by Immunoassay     Radiology/Consults ordered today: None    Total time spent in with the patient evaluation:  20 min  Additional time spent reviewing pertinent lab tests and chart notes, and documentation:  10 min    Follow-up:  2/2022    VARGAS De Luna MD, MS  Endocrinology  [facility]    CC:  Dr. IZAIAH Anderson/[facility]   Dr. Lucina Bales/[facility]            Again, thank you for allowing me to participate in the care of your patient.        Sincerely,        Adithya De Luna MD

## 2021-09-20 NOTE — PROGRESS NOTES
Recent issues:  Endocrinology followup evaluation, last appt 6/2020  Right hip replacement 1/2021, but aching at knees, hips, back areas  2/2021 leg DVT's and bilateral PE's, then Xarelto use for 3 months  7/2021. Vertigo symptoms           Pituitary:  1997. 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 Tod Loving/neurosurgery  10/16/97 TSS pituitary surgery at [facility]     Devoloped postop diabetes insipidus (DI)  Postop mild persistent elevation with IGF-1 level, normalized with Octreotide treatment  Has taken Somatuline (somatostatin), ddAVP for treatment     Head MRI imaging includes:  2/10/10 MRI:              Postop changes after TSS              No evidence for recurrent tumor  9/24/13 MRI:              Postop changes following pituitary surgery              Enhancing tissues 0.45x 1.3x 1.4 cm              No clear evidence of recurrent tumor  12/19/16 MRI:              Postop changes              Enhancing tissues 0.65x 0.8x ~1.4 cm     Other imaging:  Pelvis XRays:               Osteochondroma bone changes  5/23/14 Abdominal U/S:              GB unremarkable without cholethiasis or GB wall thickening  3/2010 Colonoscopy:              Polyp removed, benign tissue  3/2015 Colonoscopy:              No polyps reported     ~10/2019. Vivelle estrogen patch discontinued  Had seen Dr. Tod Izaguirre/[facility]      Previous IGF-1 levels include:  8/27/21 234 ng/mL (nl )  4/5/19  256  10/11/9 308  10/2/20 165    Recent  labs include:  Lab Results   Component Value Date    PROLACTIN 7 10/02/2020    HGH 0.5 10/02/2020    MARCOS 5.5 10/02/2020    TSH 1.15 10/02/2020    T4 0.93 10/02/2020        Current medications include:              Somatuline 120 mg subcutaneous injections to hip area, every Q2 mo cycle    Last dose 7/27/21               ddAVP 1-spray each morning and 0-1 sprays each evening        Thyroid:  History of multinodular goiter  Surgical evaluation at [facility]  7/2005. Left thyroid lobectomy  Path:  Benign adenomas  No evidence for postop hypothyroidism  No previous treatment with thyroid medication    2/24/21 Thyroid U/S:   Right lobe 5.3 x 3.1 x 3 cm and left lobe surgically absent   Several small (<1 cm) nodules throughout right thyroid lobe    12/19/17 labs:  IGF1 210 ng/mL (nl ), GH 0.9 ng/mL (nl <7.1), glucose 93 mg/dl, prolactin 6.0 ng/mL, TSH 1.77 uIU/mL, FT4 0.95 ng/dL     Recent [facility] labs include:  Lab Results   Component Value Date    TSH 1.15 10/02/2020    T4 0.93 10/02/2020           , lives in [place], 2 children Taj and Olivia.  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 IZAIAH Anderson/[facility] for general medicine evaluations.  Also sees Dr. Lucina Bales and also [facility]        ROS: 10 point ROS neg other than the symptoms noted above in the HPI.     GENERAL:  fatigue, wt stable; denies fevers, chills, night sweats.    HEENT: no dysphagia, odonophagia, diplopia, neck pain  THYROID:  no apparent hyper or hypothyroid symptoms  CV: no chest pain, pressure, palpitations  LUNGS: no SOB, HOPPER, cough, wheezing   ABDOMEN: no diarrhea, constipation, abdominal pain  EXTREMITIES: no rashes, ulcers, edema  NEUROLOGY: no headaches, denies changes in vision, tingling, extremitiy numbness   MSK: aches at knees and back; denies muscle weakness  SKIN: no rashes or lesions  :  Increased urination if no ddAVP use; no menses since JOANN/BSO 10/2007  PSYCH:  stable mood, no significant anxiety or depression  ENDOCRINE: no heat or cold intolerance    Physical Exam   VS: /77   Pulse 68   Wt 108 kg (238 lb)   BMI 35.15 kg/m    GENERAL: AXOX3, NAD, well dressed, answering questions appropriately, appears stated age.  ENT: prominent mandible and  facial bones; no nose swelling or nasal discharge, mouth redness or gum changes.  EYES: eyes grossly normal to inspection, conjunctivae and sclerae normal, no exophthalmos or proptosis  THYROID:  Low-horizontal anterior neck scar; no apparent nodules or goiter  CV:  RRR without murmurs  LUNGS: no audible wheeze, cough or visible cyanosis, or increased work of breathing  ABDOMEN: abdomen obese size  EXTREMITIES: large hands; no edema noted  NEUROLOGY: CN grossly intact, no tremors  MSK: grossly intact  SKIN:  scalp hair thinning; no apparent skin lesions, rash, or edema with visualized skin appearance  PSYCH: mentation appears normal, affect normal/bright, judgement and insight intact,   normal speech and appearance well groomed     LABS:     All pertinent notes, labs, and images personally reviewed by me.     A/P:  Encounter Diagnoses   Name Primary?     Acromegaly (H) Yes     DI (diabetes insipidus) (H)      History of partial thyroidectomy      Hair thinning      Comments:   Reviewed health history, pituitary, and thyroid issues.  Difficult to understand the Somatuline treatment at her OBGYN clinic without medical records  Reviewed and interpreted tests that I previously ordered.   Ordered appropriate tests for the endocrinology disease management.    Management options discussed and implemented after shared medical decision making with the patient.  Acromegaly problem is chronic-stable     Plan:  Discussed general issues with acromegaly diagnosis and management  We have reviewed pituitary gland anatomy and hormone physiology  Discussed lab tests used to assess patient pituitary-axis hormone levels  We have discussed the previous pituitary MRI imaging procedure  Reviewed treatment options with the somatostatin and ddAVP medication use  Reviewed lab testing to screen for hypopituitarism and hypothyroidism     Recommend:  Reviewed the pituitary disease management plan  Continue current ddAVP spray BID  dosing  Continue the Somatuline 120 mg Q 3 month injection dosing at [facility]   If significant rise in IGF-1 level, change to 120 mg Q 2 months   Needs repeat endocrine lab testing   Recommended nearest [facility], but she prefers [facility] or [facility]   Lab order forms given to patient, diagnoses and our fax number added  No head MRI imaging needed at this time.  Monitor for symptom changes  Will summarize results and recommendations soon     Keep regular general medicine, orthopedic, and GYN appointments  Addressed patient questions today     There are no Patient Instructions on file for this visit.    Future labs ordered today:   Orders Placed This Encounter   Procedures     Testosterone total     Hemoglobin A1c     Basic metabolic panel     TSH     T4 free     Insulin Growth Factor 1 by Immunoassay     Radiology/Consults ordered today: None    Total time spent in with the patient evaluation:  20 min  Additional time spent reviewing pertinent lab tests and chart notes, and documentation:  10 min    Follow-up:  2/2022    VARGAS De Luna MD, MS  Endocrinology  [facility]    CC:  Dr. IZAIAH Anderson/[facility]   Dr. Lucina Bales/[facility]

## 2021-09-27 ENCOUNTER — TRANSFERRED RECORDS (OUTPATIENT)
Dept: HEALTH INFORMATION MANAGEMENT | Facility: CLINIC | Age: 61
End: 2021-09-27
Payer: COMMERCIAL

## 2021-09-27 LAB
CREATININE (EXTERNAL): 1.06 MG/DL (ref 0.51–0.95)
GFR ESTIMATED (EXTERNAL): 57 ML/MIN/1.73M2
GLUCOSE (EXTERNAL): 133 MG/DL (ref 74–100)
HBA1C MFR BLD: 6.4 % (ref 4.8–5.6)
POTASSIUM (EXTERNAL): 3.9 MMOL/L (ref 3.5–5.1)

## 2021-11-04 ENCOUNTER — TELEPHONE (OUTPATIENT)
Dept: ENDOCRINOLOGY | Facility: CLINIC | Age: 61
End: 2021-11-04

## 2021-11-04 NOTE — TELEPHONE ENCOUNTER
[facility] Call Center    Phone Message    May a detailed message be left on voicemail: no     Reason for Call: Other: Per Shahzad with [employer] they wanted to advise the care team that they have been unable to speak with the Pt to get the Pt re-enrolled in their program for the Rx: SOMATULINE DEPOT 120 MG/0.5ML SOLN injection. If any questions please call back at: [phone]     Action Taken: Message routed to:  Other: endocrine    Travel Screening: Not Applicable

## 2021-11-05 ENCOUNTER — TELEPHONE (OUTPATIENT)
Dept: ENDOCRINOLOGY | Facility: CLINIC | Age: 61
End: 2021-11-05

## 2021-11-05 NOTE — TELEPHONE ENCOUNTER
[facility]    Phone Message    May a detailed message be left on voicemail: yes     Reason for Call: Requesting Results   Name/type of test: Per pt. She had lab work done on 9/27/21 per [facility]. Per Pt these labs were done through [facility]. Writer is unsure if these were sent out by provider. Writer unable to find any lab results in chart from that date. Pt is requesting that these lab results be mailed to her at:   [id number] [address] [place] [id number]  Please review, and follow up with Pt for questions or if unable to accommodate this request.  Date of test: 9/27/21  Was test done at a location other than [facility] (Please fill in the location if not [facility])?: Yes: [facility]      Action Taken: Message routed to:  Other: endocrine    Travel Screening: Not Applicable

## 2021-11-05 NOTE — TELEPHONE ENCOUNTER
Message noted.  This patient has been enrolled in this [facility] patient assistance program for discounted cost of the Somatuline medication. They need to speak with her directly to get acknowledgement that she wishes to stay in the program.    Please contact patient and ask her to call them (number listed) so she can stay enrolled in program.  Thanks.    VARGAS De Luna MD, MS  Endocrinology  [facility]

## 2022-02-24 ENCOUNTER — OFFICE VISIT (OUTPATIENT)
Dept: ENDOCRINOLOGY | Facility: CLINIC | Age: 62
End: 2022-02-24
Payer: COMMERCIAL

## 2022-02-24 VITALS
WEIGHT: 243.8 LBS | DIASTOLIC BLOOD PRESSURE: 70 MMHG | HEART RATE: 70 BPM | BODY MASS INDEX: 36 KG/M2 | SYSTOLIC BLOOD PRESSURE: 125 MMHG

## 2022-02-24 DIAGNOSIS — E22.0 ACROMEGALY (H): Primary | ICD-10-CM

## 2022-02-24 DIAGNOSIS — E23.2 DI (DIABETES INSIPIDUS) (H): ICD-10-CM

## 2022-02-24 DIAGNOSIS — E89.0 HISTORY OF PARTIAL THYROIDECTOMY: ICD-10-CM

## 2022-02-24 PROCEDURE — 99214 OFFICE O/P EST MOD 30 MIN: CPT | Performed by: INTERNAL MEDICINE

## 2022-02-24 NOTE — PROGRESS NOTES
Recent issues:  Endocrinology followup evaluation  Right hip replacement 1/2021, then left hip LISA 1/13/22  Prior 2/2021 leg DVT's and bilateral PE's, then Xarelto use for 3 months. Restarted Xarelto after recent hip surgery also           Pituitary:  1997. 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 Tod Loving/neurosurgery  10/16/97 TSS pituitary surgery at [facility]     Devoloped postop diabetes insipidus (DI)  Postop mild persistent elevation with IGF-1 level, normalized with Octreotide treatment  Has taken Somatuline (somatostatin), ddAVP for treatment     Head MRI imaging includes:  2/10/10 MRI:              Postop changes after TSS              No evidence for recurrent tumor  9/24/13 MRI:              Postop changes following pituitary surgery              Enhancing tissues 0.45x 1.3x 1.4 cm              No clear evidence of recurrent tumor  12/19/16 MRI:              Postop changes              Enhancing tissues 0.65x 0.8x ~1.4 cm     Other imaging:  Pelvis XRays:               Osteochondroma bone changes  5/23/14 Abdominal U/S:              GB unremarkable without cholethiasis or GB wall thickening  3/2010 Colonoscopy:              Polyp removed, benign tissue  3/2015 Colonoscopy:              No polyps reported     ~10/2019. Vivelle estrogen patch discontinued  Had seen Dr. Tod Izaguirre/[facility]      Previous IGF-1 levels include:  4/5/19  256  10/11/19 308  10/2/20 165  8/27/21 234 ng/mL (nl )    Recent  labs include:  Lab Results   Component Value Date    PROLACTIN 7 10/02/2020    HGH 0.5 10/02/2020    MARCOS 5.5 10/02/2020    TSH 1.15 10/02/2020    T4 0.93 10/02/2020        Current medications include:              Somatuline 120 mg subcutaneous injections to hip area, every Q2 mo cycle    Last doses were 12/2021  then repeated 2/8/22 at [facility]              ddAVP 1-spray each morning and 0-1 sprays each evening        Thyroid:  History of multinodular goiter  Surgical evaluation at [facility]  7/2005. Left thyroid lobectomy  Path:  Benign adenomas  No evidence for postop hypothyroidism  No previous treatment with thyroid medication    2/24/21 Thyroid U/S:   Right lobe 5.3 x 3.1 x 3 cm and left lobe surgically absent   Several small (<1 cm) nodules throughout right thyroid lobe    12/19/17 labs:  IGF1 210 ng/mL (nl ), GH 0.9 ng/mL (nl <7.1), glucose 93 mg/dl, prolactin 6.0 ng/mL, TSH 1.77 uIU/mL, FT4 0.95 ng/dL  9/27/21 labs ([facility]...): 256 ng/mL (nl )  Recent [facility] labs include:  Lab Results   Component Value Date    TSH 1.15 10/02/2020    T4 0.93 10/02/2020           , lives in [place], 2 children Taj and Olivia.  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 IZAIAH Anderson/[facility] for general medicine evaluations.  Also sees Dr. Lucina Bales and also Lucina NAPOLES/[facility]       PMH/PSH:  Past Medical History:   Diagnosis Date     Acromegaly (H)      Breast lump      Cataract      Diabetes insipidus (H)      Kidney infection      Multiple thyroid nodules      TOBY (obstructive sleep apnea)      Osteochondroma      Past Surgical History:   Procedure Laterality Date     HYSTERECTOMY TOTAL ABDOMINAL  10/2007     PARTIAL THYROIDECTOMY Left 07/2005     PITUITARY SURGERY  10/1997    TSS pituitary surgery       Family Hx:  No family history on file.      Social Hx:  Social History     Socioeconomic History     Marital status:      Spouse name: Not on file     Number of children: Not on file     Years of education: Not on file     Highest education level: Not on file   Occupational History     Not on file   Tobacco Use     Smoking status: Never Smoker     Smokeless tobacco: Never Used   Substance and Sexual Activity     Alcohol use: Yes      Drug use: No     Sexual activity: Not on file   Other Topics Concern     Not on file   Social History Narrative     Not on file     Social Determinants of Health     Financial Resource Strain: Not on file   Food Insecurity: Not on file   Transportation Needs: Not on file   Physical Activity: Not on file   Stress: Not on file   Social Connections: Not on file   Intimate Partner Violence: Not on file   Housing Stability: Not on file          MEDICATIONS:  has a current medication list which includes the following prescription(s): amlodipine, desmopressin, escitalopram, multivitamin w/minerals, omega-3 acid ethyl esters, rivaroxaban anticoagulant, somatuline depot, and cholecalciferol.       ROS: 10 point ROS neg other than the symptoms noted above in the HPI.     GENERAL:  fatigue, wt stable; denies fevers, chills, night sweats.    HEENT: no dysphagia, odonophagia, diplopia, neck pain  THYROID:  no apparent hyper or hypothyroid symptoms  CV: no chest pain, pressure, palpitations  LUNGS: no SOB, HOPPER, cough, wheezing   ABDOMEN: no diarrhea, constipation, abdominal pain  EXTREMITIES: no rashes, ulcers, edema  NEUROLOGY: no headaches, denies changes in vision, tingling, extremitiy numbness   MSK: aches at knees and back; denies muscle weakness  SKIN: no rashes or lesions  :  Increased urination if no ddAVP use; no menses since JOANN/BSO 10/2007  PSYCH:  stable mood, no significant anxiety or depression  ENDOCRINE: no heat or cold intolerance    Physical Exam   VS: /70   Pulse 70   Wt 110.6 kg (243 lb 12.8 oz)   BMI 36.00 kg/m    GENERAL: AXOX3, NAD, well dressed, answering questions appropriately, appears stated age.  ENT: prominent mandible and facial bones; no nose swelling or nasal discharge, mouth redness or gum changes.  EYES: eyes grossly normal to inspection, conjunctivae and sclerae normal, no exophthalmos or proptosis  THYROID:  low-horizontal anterior neck scar; no apparent nodules or goiter  CV:   RRR without murmurs  LUNGS: no audible wheeze, cough or visible cyanosis, or increased work of breathing  ABDOMEN: abdomen obese size  EXTREMITIES: large hands; no edema noted  NEUROLOGY: CN grossly intact, no tremors  MSK: grossly intact  SKIN:  scalp hair thinning; no apparent skin lesions, rash, or edema with visualized skin appearance  PSYCH: mentation appears normal, affect normal/bright, judgement and insight intact,   normal speech and appearance well groomed     LABS:     All pertinent notes, labs, and images personally reviewed by me.     A/P:  Encounter Diagnoses   Name Primary?     Acromegaly (H) Yes     DI (diabetes insipidus) (H)      History of partial thyroidectomy        Comments:   Reviewed health history, pituitary, and thyroid issues.  Difficult to understand the Somatuline treatment at her [facility] without medical records  Reviewed and interpreted tests that I previously ordered.   Ordered appropriate tests for the endocrinology disease management.    Management options discussed and implemented after shared medical decision making with the patient.  Acromegaly problem is chronic-stable     Plan:  Discussed general issues with acromegaly diagnosis and management  We have reviewed pituitary gland anatomy and hormone physiology  Discussed lab tests used to assess patient pituitary-axis hormone levels  We have discussed the previous pituitary MRI imaging procedure  Reviewed treatment options with the somatostatin and ddAVP medication use  Reviewed lab testing to screen for hypopituitarism and hypothyroidism     Recommend:  Reviewed the pituitary disease management plan  Continue the Somatuline 120 mg Q 2 month injection dosing at [facility]  Continue current ddAVP spray BID dosing  Needs repeat endocrine lab testing   Recommended nearest [facility] clinic, but she prefers [facility] or [facility]   Lab order forms given to patient, diagnoses and our fax number added  No head MRI  imaging needed at this time.  Monitor for symptom changes  Will summarize results and recommendations soon     Keep regular general medicine, orthopedic, and GYN appointments  Addressed patient questions today     There are no Patient Instructions on file for this visit.    Future labs ordered today:   Orders Placed This Encounter   Procedures     Insulin Growth Factor 1 by Immunoassay       TSH   FT4   BMP   hgbA1c    Radiology/Consults ordered today: None    Total time spent in with the patient evaluation:  23 min  Additional time spent reviewing pertinent lab tests and chart notes, and documentation:  10 min    Follow-up:  8/2022, Return    VARGAS De Luna MD, MS  Endocrinology  [facility]

## 2022-02-24 NOTE — LETTER
2/24/2022         RE: Anisa Robles  [address] [address]        Dear Colleague,    Thank you for referring your patient, Anisa Robles, to the [facility] [place]. Please see a copy of my visit note below.      Recent issues:  Endocrinology followup evaluation  Right hip replacement 1/2021, then left hip LISA 1/13/22  Prior 2/2021 leg DVT's and bilateral PE's, then Xarelto use for 3 months. Restarted Xarelto after recent hip surgery also           Pituitary:  1997. 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 Tod Loving/neurosurgery  10/16/97 TSS pituitary surgery at [facility]     Devoloped postop diabetes insipidus (DI)  Postop mild persistent elevation with IGF-1 level, normalized with Octreotide treatment  Has taken Somatuline (somatostatin), ddAVP for treatment     Head MRI imaging includes:  2/10/10 MRI:              Postop changes after TSS              No evidence for recurrent tumor  9/24/13 MRI:              Postop changes following pituitary surgery              Enhancing tissues 0.45x 1.3x 1.4 cm              No clear evidence of recurrent tumor  12/19/16 MRI:              Postop changes              Enhancing tissues 0.65x 0.8x ~1.4 cm     Other imaging:  Pelvis XRays:               Osteochondroma bone changes  5/23/14 Abdominal U/S:              GB unremarkable without cholethiasis or GB wall thickening  3/2010 Colonoscopy:              Polyp removed, benign tissue  3/2015 Colonoscopy:              No polyps reported     ~10/2019. Vivelle estrogen patch discontinued  Had seen Dr. Tod Izaguirre/[facility]      Previous IGF-1 levels include:  8/27/21 234 ng/mL (nl )  4/5/19  256  10/11/9 308  10/2/20 165    Recent  labs include:  Lab Results   Component Value Date    PROLACTIN  7 10/02/2020    HGH 0.5 10/02/2020    MARCOS 5.5 10/02/2020    TSH 1.15 10/02/2020    T4 0.93 10/02/2020        Current medications include:              Somatuline 120 mg subcutaneous injections to hip area, every Q2 mo cycle    Last doses were 12/2021 then repeated 2/8/22 at [facility]              ddAVP 1-spray each morning and 0-1 sprays each evening        Thyroid:  History of multinodular goiter  Surgical evaluation at [facility]  7/2005. Left thyroid lobectomy  Path:  Benign adenomas  No evidence for postop hypothyroidism  No previous treatment with thyroid medication    2/24/21 Thyroid U/S:   Right lobe 5.3 x 3.1 x 3 cm and left lobe surgically absent   Several small (<1 cm) nodules throughout right thyroid lobe    12/19/17 labs:  IGF1 210 ng/mL (nl ), GH 0.9 ng/mL (nl <7.1), glucose 93 mg/dl, prolactin 6.0 ng/mL, TSH 1.77 uIU/mL, FT4 0.95 ng/dL  9/27/21 labs ([facility]...): 256 ng/mL (nl )  Recent [facility] labs include:  Lab Results   Component Value Date    TSH 1.15 10/02/2020    T4 0.93 10/02/2020           , lives in [place], 2 children Taj and Olivia.  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 IZAIAH Anderson/[facility] for general medicine evaluations.  Also sees Dr. Lucina Bales and also Lucina NAPOLES/[facility]       PMH/PSH:  Past Medical History:   Diagnosis Date     Acromegaly (H)      Breast lump      Cataract      Diabetes insipidus (H)      Kidney infection      Multiple thyroid nodules      TOBY (obstructive sleep apnea)      Osteochondroma      Past Surgical History:   Procedure Laterality Date     HYSTERECTOMY TOTAL ABDOMINAL  10/2007     PARTIAL THYROIDECTOMY Left 07/2005     PITUITARY SURGERY  10/1997    TSS pituitary surgery       Family Hx:  No family history on file.      Social Hx:  Social History     Socioeconomic History     Marital status:      Spouse name: Not on file     Number of children: Not on  file     Years of education: Not on file     Highest education level: Not on file   Occupational History     Not on file   Tobacco Use     Smoking status: Never Smoker     Smokeless tobacco: Never Used   Substance and Sexual Activity     Alcohol use: Yes     Drug use: No     Sexual activity: Not on file   Other Topics Concern     Not on file   Social History Narrative     Not on file     Social Determinants of Health     Financial Resource Strain: Not on file   Food Insecurity: Not on file   Transportation Needs: Not on file   Physical Activity: Not on file   Stress: Not on file   Social Connections: Not on file   Intimate Partner Violence: Not on file   Housing Stability: Not on file          MEDICATIONS:  has a current medication list which includes the following prescription(s): amlodipine, desmopressin, escitalopram, multivitamin w/minerals, omega-3 acid ethyl esters, rivaroxaban anticoagulant, somatuline depot, and cholecalciferol.       ROS: 10 point ROS neg other than the symptoms noted above in the HPI.     GENERAL:  fatigue, wt stable; denies fevers, chills, night sweats.    HEENT: no dysphagia, odonophagia, diplopia, neck pain  THYROID:  no apparent hyper or hypothyroid symptoms  CV: no chest pain, pressure, palpitations  LUNGS: no SOB, HOPPER, cough, wheezing   ABDOMEN: no diarrhea, constipation, abdominal pain  EXTREMITIES: no rashes, ulcers, edema  NEUROLOGY: no headaches, denies changes in vision, tingling, extremitiy numbness   MSK: aches at knees and back; denies muscle weakness  SKIN: no rashes or lesions  :  Increased urination if no ddAVP use; no menses since JOANN/BSO 10/2007  PSYCH:  stable mood, no significant anxiety or depression  ENDOCRINE: no heat or cold intolerance    Physical Exam   VS: /70   Pulse 70   Wt 110.6 kg (243 lb 12.8 oz)   BMI 36.00 kg/m    GENERAL: AXOX3, NAD, well dressed, answering questions appropriately, appears stated age.  ENT: prominent mandible and facial bones;  no nose swelling or nasal discharge, mouth redness or gum changes.  EYES: eyes grossly normal to inspection, conjunctivae and sclerae normal, no exophthalmos or proptosis  THYROID:  low-horizontal anterior neck scar; no apparent nodules or goiter  CV:  RRR without murmurs  LUNGS: no audible wheeze, cough or visible cyanosis, or increased work of breathing  ABDOMEN: abdomen obese size  EXTREMITIES: large hands; no edema noted  NEUROLOGY: CN grossly intact, no tremors  MSK: grossly intact  SKIN:  scalp hair thinning; no apparent skin lesions, rash, or edema with visualized skin appearance  PSYCH: mentation appears normal, affect normal/bright, judgement and insight intact,   normal speech and appearance well groomed     LABS:     All pertinent notes, labs, and images personally reviewed by me.     A/P:  Encounter Diagnoses   Name Primary?     Acromegaly (H) Yes     DI (diabetes insipidus) (H)      History of partial thyroidectomy        Comments:   Reviewed health history, pituitary, and thyroid issues.  Difficult to understand the Somatuline treatment at her [facility] clinic without medical records  Reviewed and interpreted tests that I previously ordered.   Ordered appropriate tests for the endocrinology disease management.    Management options discussed and implemented after shared medical decision making with the patient.  Acromegaly problem is chronic-stable     Plan:  Discussed general issues with acromegaly diagnosis and management  We have reviewed pituitary gland anatomy and hormone physiology  Discussed lab tests used to assess patient pituitary-axis hormone levels  We have discussed the previous pituitary MRI imaging procedure  Reviewed treatment options with the somatostatin and ddAVP medication use  Reviewed lab testing to screen for hypopituitarism and hypothyroidism     Recommend:  Reviewed the pituitary disease management plan  Continue the Somatuline 120 mg Q 2 month injection dosing at [facility]  clinic  Continue current ddAVP spray BID dosing  Needs repeat endocrine lab testing   Recommended nearest [facility] clinic, but she prefers [facility] or [facility]   Lab order forms given to patient, diagnoses and our fax number added  No head MRI imaging needed at this time.  Monitor for symptom changes  Will summarize results and recommendations soon     Keep regular general medicine, orthopedic, and GYN appointments  Addressed patient questions today     There are no Patient Instructions on file for this visit.    Future labs ordered today:   Orders Placed This Encounter   Procedures     Insulin Growth Factor 1 by Immunoassay       TSH   FT4   BMP   hgbA1c    Radiology/Consults ordered today: None    Total time spent in with the patient evaluation:  23 min  Additional time spent reviewing pertinent lab tests and chart notes, and documentation:  10 min    Follow-up:  8/2022, Return    VARGAS De Luna MD, MS  Endocrinology  [facility]                Again, thank you for allowing me to participate in the care of your patient.        Sincerely,        Adithya De Luna MD

## 2022-03-01 ENCOUNTER — TRANSFERRED RECORDS (OUTPATIENT)
Dept: HEALTH INFORMATION MANAGEMENT | Facility: CLINIC | Age: 62
End: 2022-03-01
Payer: COMMERCIAL

## 2022-03-01 LAB
CREATININE (EXTERNAL): 0.81 MG/DL (ref 0.51–0.95)
GFR ESTIMATED (EXTERNAL): >60 ML/MIN/1.73M*2
GLUCOSE (EXTERNAL): 81 MG/DL (ref 74–100)
HBA1C MFR BLD: 5.9 % (ref 4.8–5.6)
POTASSIUM (EXTERNAL): 4 MMOL/L (ref 3.5–5.1)
TSH SERPL-ACNC: 1.42 ULU/ML (ref 0.35–3.74)

## 2022-04-04 ENCOUNTER — TELEPHONE (OUTPATIENT)
Dept: ENDOCRINOLOGY | Facility: CLINIC | Age: 62
End: 2022-04-04
Payer: COMMERCIAL

## 2022-04-04 NOTE — TELEPHONE ENCOUNTER
Reason for Call:  Request for results:    Name of test or procedure: labs    Date of test of procedure: patient did not know date     Location of the test or procedure: [facility] in [place]    OK to leave the result message on voice mail or with a family member? YES    Phone number Patient can be reached at:  [phone]    Additional comments: please call with results.    Call taken on 4/4/2022 at 12:36 PM by Clare Patel

## 2022-04-12 NOTE — TELEPHONE ENCOUNTER
"Message reviewed again.  I have been waiting for the final lab results to be faxed from the [facility] lab, since these (recently done) labs not yet accessible to us in the Epic \"Care Everywhere\" tab.  That lab has now sent all of the results.    These tests from 3/1/22 showed normal (and improved) IGF-1 (growth hormone related test) 192 ng/mL (normal ) while she's been on the Somatuline LAR medication to suppress the growth hormone secretion.      Other results included slightly elevated hgbA1c 5.9% (normal 4.8- 5.6), also normal total testosterone 11 ng/dL (normal 8- 60), free testosterone 0.30 ng/dL (normal 0.06- 0.87), thyroid levels TSH 1.422 uIU/mL (normal 0.35- 3.74) and free T4 0.84 ng/dL (normal 0.76- 1.46), and normal electrolyte panel.    Please relay feedback to patient.  I will also mail her a lab letter (US Mail) with these details.  Thanks.    VARGAS De Luna MD, MS  Endocrinology  [facility]            "

## 2022-06-08 ENCOUNTER — TELEPHONE (OUTPATIENT)
Dept: ENDOCRINOLOGY | Facility: CLINIC | Age: 62
End: 2022-06-08

## 2022-06-08 NOTE — TELEPHONE ENCOUNTER
Prior Authorization Retail Medication Request    Medication/Dose: Somatuline depot 120mg/0.5ml  ICD code (if different than what is on RX):  e22.0      Insurance Name:  [employer]  Insurance ID:  [id number]       Pharmacy Information (if different than what is on RX)  Name:  [facility]  Phone:  Not on form

## 2022-06-13 NOTE — TELEPHONE ENCOUNTER
Prior Authorization Approval    Authorization Effective Date: 5/14/2022  Authorization Expiration Date: 6/13/2023  Medication: Somatuline depot PA INITIATED 6/13/22 PA APPROVED 6/13/22  Approved Dose/Quantity: 0.5ml Every 60 days  Reference #:     Insurance Company: [facility] [employer] - Phone [phone] Fax [phone]  Expected CoPay:       CoPay Card Available:      Foundation Assistance Needed:    Which Pharmacy is filling the prescription (Not needed for infusion/clinic administered): [address]  Pharmacy Notified: Yes  Patient Notified: Comment:  Pharmacy will notify patient.          PA Initiation    Medication: Somatuline depot PA INITIATED 6/13/22  Insurance Company: [employer] Phone [phone] Fax [phone]  Pharmacy Filling the Rx: [address]  Filling Pharmacy Phone: [phone]  Filling Pharmacy Fax:    Start Date: 6/13/2022    Central Prior Authorization Team   Phone: [phone]

## 2022-08-08 DIAGNOSIS — E23.2 DI (DIABETES INSIPIDUS) (H): ICD-10-CM

## 2022-08-08 RX ORDER — DESMOPRESSIN ACETATE 0.1 MG/ML
SOLUTION NASAL
Qty: 5 ML | Refills: 5 | Status: SHIPPED | OUTPATIENT
Start: 2022-08-08 | End: 2023-03-06

## 2022-08-08 NOTE — TELEPHONE ENCOUNTER
Last Written Prescription Date:  9/20/21  Last Fill Quantity: 5,  # refills: 5   Last office visit: 2/24/2022 with prescribing provider:  Dr. De Luna  Future Office Visit:  8/25/22        Requested Prescriptions   Pending Prescriptions Disp Refills     desmopressin (DDAVP) 0.01 % spray [Pharmacy Med Name: DESMOPRESSIN 10 MCG/0.1 ML 0.01 Solution] 5 mL 5     Sig: USE 1 SPRAY TO NOSTRILS TWICE PER DAY AS DIRECTED       There is no refill protocol information for this order        Routing to provider.  Saida Sanders RN

## 2022-08-25 ENCOUNTER — OFFICE VISIT (OUTPATIENT)
Dept: ENDOCRINOLOGY | Facility: CLINIC | Age: 62
End: 2022-08-25
Payer: COMMERCIAL

## 2022-08-25 VITALS
SYSTOLIC BLOOD PRESSURE: 120 MMHG | BODY MASS INDEX: 34.87 KG/M2 | DIASTOLIC BLOOD PRESSURE: 77 MMHG | WEIGHT: 236.1 LBS | HEART RATE: 81 BPM

## 2022-08-25 DIAGNOSIS — R73.03 PREDIABETES: ICD-10-CM

## 2022-08-25 DIAGNOSIS — E89.0 HISTORY OF PARTIAL THYROIDECTOMY: ICD-10-CM

## 2022-08-25 DIAGNOSIS — E23.2 DI (DIABETES INSIPIDUS) (H): ICD-10-CM

## 2022-08-25 DIAGNOSIS — E22.0 ACROMEGALY (H): Primary | ICD-10-CM

## 2022-08-25 PROCEDURE — 99214 OFFICE O/P EST MOD 30 MIN: CPT | Performed by: INTERNAL MEDICINE

## 2022-08-25 NOTE — LETTER
8/25/2022         RE: Anisa Robles  [id number] [address]        Dear Colleague,    Thank you for referring your patient, Anisa Robles, to the [facility] [place]. Please see a copy of my visit note below.      Recent issues:  Endocrinology followup evaluation  Previous right hip replacement 1/2021, then 2/2021 right leg DVT's and bilateral PE's in 2/2021, then Xarelto use for 3 months.   Subsequent left hip LISA 1/13/22  No significant hip pains but low back pain problem, also chronic fatigue  Had [place] ED evaluation 7/2022 for right leg swelling, then f/u with PCP and change in BP med, CT abdomen procedure         Pituitary:  1997. 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 Tod Loving/neurosurgery  10/16/97 TSS pituitary surgery at [facility]     Devoloped postop diabetes insipidus (DI)  Postop mild persistent elevation with IGF-1 level, normalized with Octreotide treatment  Has taken Somatuline (somatostatin), ddAVP for treatment     Head MRI imaging includes:  2/10/10 MRI:              Postop changes after TSS              No evidence for recurrent tumor  9/24/13 MRI:              Postop changes following pituitary surgery              Enhancing tissues 0.45 x 1.3 x 1.4 cm              No clear evidence of recurrent tumor  12/19/16 MRI:              Postop changes              Enhancing tissues 0.65 x 0.8 x ~1.4 cm     Other imaging:  Pelvis XRays:               Osteochondroma bone changes  5/23/14 Abdominal U/S:              GB unremarkable without cholethiasis or GB wall thickening  3/2010 Colonoscopy:              Polyp removed, benign tissue  3/2015 Colonoscopy:              No polyps reported     ~10/2019. Vivelle estrogen patch discontinued  Had seen Dr. Tod Izaguirre/[facility]      Previous IGF-1 levels include:  4/5/19  256  10/11/19 308  10/2/20 165  8/27/21 234 ng/mL (nl )    Recent [facility] labs include:  Lab Results   Component Value Date    PROLACTIN 7 10/02/2020    HGH 0.5 10/02/2020    MARCOS 5.5 10/02/2020    TSH 1.15 10/02/2020    T4 0.93 10/02/2020        Current medications include: Somatuline 120 mg subcutaneous injections to hip area, every Q2 mo cycle      Last dose 7/8/22 at [facility]                 ddAVP 1-spray each morning and 0-1 sprays each evening        Thyroid:  History of multinodular goiter  Surgical evaluation at [facility]  7/2005. Left thyroid lobectomy  Path:  Benign adenomas  No evidence for postop hypothyroidism  No previous treatment with thyroid medication    2/24/21 Thyroid U/S:   Right lobe 5.3 x 3.1 x 3 cm and left lobe surgically absent   Several small (<1 cm) nodules throughout right thyroid lobe    12/19/17 labs:  IGF1 210 ng/mL (nl ), GH 0.9 ng/mL (nl <7.1), glucose 93 mg/dl, prolactin 6.0 ng/mL, TSH 1.77 uIU/mL, FT4 0.95 ng/dL  9/27/21 labs ([facility]...): 256 ng/mL (nl )  Recent [facility] labs include:  Lab Results   Component Value Date    TSH 1.15 10/02/2020    T4 0.93 10/02/2020           , lives in [place], 2 children Taj and Olivia.  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 Saw Anderson/[facility] for general medicine evaluations.  Also sees Dr. Lucina Bales and also Lucina NAPOLES/[facility]       PMH/PSH:  Past Medical History:   Diagnosis Date     Acromegaly (H)      Breast lump      Cataract      Diabetes insipidus (H)      Kidney infection      Multiple thyroid nodules      TOBY (obstructive sleep apnea)      Osteochondroma      Past Surgical History:   Procedure Laterality Date     HYSTERECTOMY TOTAL ABDOMINAL  10/2007     PARTIAL THYROIDECTOMY Left 07/2005     PITUITARY SURGERY  10/1997    TSS pituitary surgery       Family Hx:  No family  history on file.      Social Hx:  Social History     Socioeconomic History     Marital status:      Spouse name: Not on file     Number of children: Not on file     Years of education: Not on file     Highest education level: Not on file   Occupational History     Not on file   Tobacco Use     Smoking status: Never Smoker     Smokeless tobacco: Never Used   Substance and Sexual Activity     Alcohol use: Yes     Drug use: No     Sexual activity: Not on file   Other Topics Concern     Not on file   Social History Narrative     Not on file     Social Determinants of Health     Financial Resource Strain: Not on file   Food Insecurity: Not on file   Transportation Needs: Not on file   Physical Activity: Not on file   Stress: Not on file   Social Connections: Not on file   Intimate Partner Violence: Not on file   Housing Stability: Not on file          MEDICATIONS:  has a current medication list which includes the following prescription(s): amlodipine, desmopressin, escitalopram, multivitamin w/minerals, somatuline depot, omega-3 acid ethyl esters, rivaroxaban anticoagulant, and cholecalciferol.       ROS: 10 point ROS neg other than the symptoms noted above in the HPI.     GENERAL:  fatigue, wt stable; denies fevers, chills, night sweats.    HEENT: no dysphagia, odonophagia, diplopia, neck pain  THYROID:  no apparent hyper or hypothyroid symptoms  CV: no chest pain, pressure, palpitations  LUNGS: no SOB, HOPPER, cough, wheezing   ABDOMEN: no diarrhea, constipation, abdominal pain  EXTREMITIES: right foot/ankle/lower leg edema  NEUROLOGY: no headaches, denies changes in vision, tingling, extremitiy numbness   MSK: aches at knees and back; denies muscle weakness  SKIN: no rashes or lesions  :  increased urination if no ddAVP use; no menses since JOANN/BSO 10/2007  PSYCH:  stable mood, no significant anxiety or depression  ENDOCRINE: no heat or cold intolerance    Physical Exam   VS: /77   Pulse 81   Wt 107.1 kg  (236 lb 1.6 oz)   BMI 34.87 kg/m    GENERAL: AXOX3, NAD, well dressed, answering questions appropriately, appears stated age.  ENT: prominent mandible and facial bones; no nose swelling or nasal discharge, mouth redness or gum changes.  EYES: eyes grossly normal to inspection, conjunctivae and sclerae normal, no exophthalmos or proptosis  THYROID:  low-horizontal anterior neck scar; no apparent nodules or goiter  CV:  RRR without murmurs  LUNGS: no audible wheeze, cough or visible cyanosis, or increased work of breathing  ABDOMEN: abdomen obese size  EXTREMITIES: some right ankle area edema, large hands  NEUROLOGY: CN grossly intact, no tremors  MSK: grossly intact  SKIN:  scalp hair thinning; no apparent skin lesions, rash, or edema with visualized skin appearance  PSYCH: mentation appears normal, affect normal/bright, judgement and insight intact,   normal speech and appearance well groomed     LABS:     All pertinent notes, labs, and images personally reviewed by me.     A/P:  Encounter Diagnoses   Name Primary?     Acromegaly (H) Yes     History of partial thyroidectomy      DI (diabetes insipidus) (H)      Prediabetes        Comments:   Reviewed health history, pituitary, and thyroid issues.  Difficult to understand the Somatuline treatment at her OBGYN clinic without medical records  Persistent right ankle area edema despite her repeat DVT workup, per patient  Reviewed and interpreted tests that I previously ordered.   Ordered appropriate tests for the endocrinology disease management.    Management options discussed and implemented after shared medical decision making with the patient.  Acromegaly problem is chronic-stable     Plan:  Discussed general issues with acromegaly diagnosis and management  We have reviewed pituitary gland anatomy and hormone physiology  Discussed lab tests used to assess patient pituitary-axis hormone levels  We have discussed the previous pituitary MRI imaging procedure  Reviewed  treatment options with the somatostatin and ddAVP medication use  Reviewed lab testing to screen for hypopituitarism and hypothyroidism     Recommend:  Reviewed the pituitary disease management plan  Continue the Somatuline 120 mg Q 2 month injection dosing at [facility]   Plan next dose ~9/8/22 and mid 11/2022   Contact our office if questions about Somatuline dosing or Rx  Continue current ddAVP spray QAM vs BID dosing  Needs repeat endocrine lab testing   Recommended nearest [facility] clinic, but she prefers [facility] or hospitals   Lab order forms given to patient, diagnoses and our fax number added  No head MRI imaging needed at this time.  Monitor for symptom changes  Will summarize results and recommendations when lab results available    Keep regular general medicine, orthopedic, and GYN appointments  Needs further evaluation of the leg edema and/or vascular medicine consultation  Addressed patient questions today     There are no Patient Instructions on file for this visit.    Future labs ordered today:   Orders Placed This Encounter   Procedures     Hemoglobin A1c     TSH     T4 free     Basic metabolic panel     Insulin Growth Factor 1 by Immunoassay     Cortisol     Radiology/Consults ordered today: None    Total time spent in with the patient evaluation:  15 min  Additional time spent reviewing pertinent lab tests and chart notes, and documentation:  6 min    Follow-up:  2/2023, Return    VARGAS De Luna MD, MS  Endocrinology  [facility]                  Again, thank you for allowing me to participate in the care of your patient.        Sincerely,        Adithya De Luna MD

## 2022-08-25 NOTE — PROGRESS NOTES
Recent issues:  Endocrinology followup evaluation  Previous right hip replacement 1/2021, then 2/2021 right leg DVT's and bilateral PE's in 2/2021, then Xarelto use for 3 months.   Subsequent left hip LISA 1/13/22  No significant hip pains but low back pain problem, also chronic fatigue  Had [place] ED evaluation 7/2022 for right leg swelling, then f/u with PCP and change in BP med, CT abdomen procedure         Pituitary:  1997. 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 Tod Loving/neurosurgery  10/16/97 TSS pituitary surgery at [facility]     Devoloped postop diabetes insipidus (DI)  Postop mild persistent elevation with IGF-1 level, normalized with Octreotide treatment  Has taken Somatuline (somatostatin), ddAVP for treatment     Head MRI imaging includes:  2/10/10 MRI:              Postop changes after TSS              No evidence for recurrent tumor  9/24/13 MRI:              Postop changes following pituitary surgery              Enhancing tissues 0.45 x 1.3 x 1.4 cm              No clear evidence of recurrent tumor  12/19/16 MRI:              Postop changes              Enhancing tissues 0.65 x 0.8 x ~1.4 cm     Other imaging:  Pelvis XRays:               Osteochondroma bone changes  5/23/14 Abdominal U/S:              GB unremarkable without cholethiasis or GB wall thickening  3/2010 Colonoscopy:              Polyp removed, benign tissue  3/2015 Colonoscopy:              No polyps reported     ~10/2019. Vivelle estrogen patch discontinued  Had seen Dr. Tod [facility]irre/[facility]      Previous IGF-1 levels include:  4/5/19  256  10/11/19 308  10/2/20 165  8/27/21 234 ng/mL (nl )    Recent  labs include:  Lab Results   Component Value Date    PROLACTIN 7 10/02/2020    HGH 0.5 10/02/2020    MARCOS 5.5 10/02/2020    TSH 1.15  10/02/2020    T4 0.93 10/02/2020        Current medications include: Somatuline 120 mg subcutaneous injections to hip area, every Q2 mo cycle      [facility]8/22 [facility]                 ddAVP 1-spray each morning and 0-1 sprays each evening        Thyroid:  History of multinodular goiter  Surgical evaluation at [facility]  7/2005. Left thyroid lobectomy  Path:  Benign adenomas  No evidence for postop hypothyroidism  No previous treatment with thyroid medication    2/24/21 Thyroid U/S:   Right lobe 5.3 x 3.1 x 3 cm and left lobe surgically absent   Several small (<1 cm) nodules throughout right thyroid lobe    12/19/17 labs:  IGF1 210 ng/mL (nl ), GH 0.9 ng/mL (nl <7.1), glucose 93 mg/dl, prolactin 6.0 ng/mL, TSH 1.77 uIU/mL, FT4 0.95 ng/dL  9/27/21 [facility] ([facility]): 256 ng/mL (nl )  Recent [facility] labs include:  Lab Results   Component Value Date    TSH 1.15 10/02/2020    T4 0.93 10/02/2020           , lives in [place], 2 children Taj and Olivia.  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 Saw Anderson/[facility] for general medicine evaluations.  Also sees Dr. Lucina Bales and also Lucina NAPOLES/[facility]       PMH/PSH:  Past Medical History:   Diagnosis Date     Acromegaly (H)      Breast lump      Cataract      Diabetes insipidus (H)      Kidney infection      Multiple thyroid nodules      TOBY (obstructive sleep apnea)      Osteochondroma      Past Surgical History:   Procedure Laterality Date     HYSTERECTOMY TOTAL ABDOMINAL  10/2007     PARTIAL THYROIDECTOMY Left 07/2005     PITUITARY SURGERY  10/1997    TSS pituitary surgery       Family Hx:  No family history on file.      Social Hx:  Social History     Socioeconomic History     Marital status:      Spouse name: Not on file     Number of children: Not on file     Years of education: Not on file     Highest education level: Not on file   Occupational  History     Not on file   Tobacco Use     Smoking status: Never Smoker     Smokeless tobacco: Never Used   Substance and Sexual Activity     Alcohol use: Yes     Drug use: No     Sexual activity: Not on file   Other Topics Concern     Not on file   Social History Narrative     Not on file     Social Determinants of Health     Financial Resource Strain: Not on file   Food Insecurity: Not on file   Transportation Needs: Not on file   Physical Activity: Not on file   Stress: Not on file   Social Connections: Not on file   Intimate Partner Violence: Not on file   Housing Stability: Not on file          MEDICATIONS:  has a current medication list which includes the following prescription(s): amlodipine, desmopressin, escitalopram, multivitamin w/minerals, somatuline depot, omega-3 acid ethyl esters, rivaroxaban anticoagulant, and cholecalciferol.       ROS: 10 point ROS neg other than the symptoms noted above in the HPI.     GENERAL:  fatigue, wt stable; denies fevers, chills, night sweats.    HEENT: no dysphagia, odonophagia, diplopia, neck pain  THYROID:  no apparent hyper or hypothyroid symptoms  CV: no chest pain, pressure, palpitations  LUNGS: no SOB, HOPPER, cough, wheezing   ABDOMEN: no diarrhea, constipation, abdominal pain  EXTREMITIES: right foot/ankle/lower leg edema  NEUROLOGY: no headaches, denies changes in vision, tingling, extremitiy numbness   MSK: aches at knees and back; denies muscle weakness  SKIN: no rashes or lesions  :  increased urination if no ddAVP use; no menses since JOANN/BSO 10/2007  PSYCH:  stable mood, no significant anxiety or depression  ENDOCRINE: no heat or cold intolerance    Physical Exam   VS: /77   Pulse 81   Wt 107.1 kg (236 lb 1.6 oz)   BMI 34.87 kg/m    GENERAL: AXOX3, NAD, well dressed, answering questions appropriately, appears stated age.  ENT: prominent mandible and facial bones; no nose swelling or nasal discharge, mouth redness or gum changes.  EYES: eyes grossly  normal to inspection, conjunctivae and sclerae normal, no exophthalmos or proptosis  THYROID:  low-horizontal anterior neck scar; no apparent nodules or goiter  CV:  RRR without murmurs  LUNGS: no audible wheeze, cough or visible cyanosis, or increased work of breathing  ABDOMEN: abdomen obese size  EXTREMITIES: some right ankle area edema, large hands  NEUROLOGY: CN grossly intact, no tremors  MSK: grossly intact  SKIN:  scalp hair thinning; no apparent skin lesions, rash, or edema with visualized skin appearance  PSYCH: mentation appears normal, affect normal/bright, judgement and insight intact,   normal speech and appearance well groomed     LABS:     All pertinent notes, labs, and images personally reviewed by me.     A/P:  Encounter Diagnoses   Name Primary?     Acromegaly (H) Yes     History of partial thyroidectomy      DI (diabetes insipidus) (H)      Prediabetes        Comments:   Reviewed health history, pituitary, and thyroid issues.  Difficult to understand the Somatuline treatment at her OBGYN clinic without medical records  Persistent right ankle area edema despite her repeat DVT workup, per patient  Reviewed and interpreted tests that I previously ordered.   Ordered appropriate tests for the endocrinology disease management.    Management options discussed and implemented after shared medical decision making with the patient.  Acromegaly problem is chronic-stable     Plan:  Discussed general issues with acromegaly diagnosis and management  We have reviewed pituitary gland anatomy and hormone physiology  Discussed lab tests used to assess patient pituitary-axis hormone levels  We have discussed the previous pituitary MRI imaging procedure  Reviewed treatment options with the somatostatin and ddAVP medication use  Reviewed lab testing to screen for hypopituitarism and hypothyroidism     Recommend:  Reviewed the pituitary disease management plan  Continue the Somatuline 120 mg Q 2 month injection dosing  at [facility]   Plan next dose ~9/8/22 and mid 11/2022   Contact our office if questions about Somatuline dosing or Rx  Continue current ddAVP spray QAM vs BID dosing  Needs repeat endocrine lab testing   Recommended nearest [facility] clinic, but she prefers [facility] or [facility]   Lab order forms given to patient, diagnoses and our fax number added  No head MRI imaging needed at this time.  Monitor for symptom changes  Will summarize results and recommendations when lab results available    Keep regular general medicine, orthopedic, and GYN appointments  Needs further evaluation of the leg edema and/or vascular medicine consultation  Addressed patient questions today     There are no Patient Instructions on file for this visit.    Future labs ordered today:   Orders Placed This Encounter   Procedures     Hemoglobin A1c     TSH     T4 free     Basic metabolic panel     Insulin Growth Factor 1 by Immunoassay     Cortisol     Radiology/Consults ordered today: None    Total time spent in with the patient evaluation:  15 min  Additional time spent reviewing pertinent lab tests and chart notes, and documentation:  6 min    Follow-up:  2/2023, Return    VARGAS De Luna MD, MS  Endocrinology  [facility]

## 2022-08-26 ENCOUNTER — TELEPHONE (OUTPATIENT)
Dept: ENDOCRINOLOGY | Facility: CLINIC | Age: 62
End: 2022-08-26

## 2022-08-26 DIAGNOSIS — E22.0 ACROMEGALY (H): ICD-10-CM

## 2022-08-26 RX ORDER — LANREOTIDE ACETATE 120 MG/.5ML
INJECTION SUBCUTANEOUS
Qty: 0.5 ML | Refills: 6 | Status: CANCELLED | OUTPATIENT
Start: 2022-08-26

## 2022-08-26 NOTE — TELEPHONE ENCOUNTER
[facility] Call Center    Phone Message    May a detailed message be left on voicemail: no     Reason for Call: Medication Refill Request    Has the patient contacted the pharmacy for the refill? Yes   Name of medication being requested: SOMATULINE DEPOT 120 MG/0.5ML SOLN injection     Provider who prescribed the medication: Calixto    Pharmacy: [address] (Pharmacy    Date medication is needed: as soon as possible      Action Taken: Message routed to:  Other: endo    Travel Screening: Not Applicable

## 2022-08-29 DIAGNOSIS — E22.0 ACROMEGALY (H): ICD-10-CM

## 2022-08-29 RX ORDER — LANREOTIDE ACETATE 120 MG/.5ML
INJECTION SUBCUTANEOUS
Qty: 0.5 ML | Refills: 6 | Status: SHIPPED | OUTPATIENT
Start: 2022-08-29 | End: 2023-08-18

## 2022-08-29 NOTE — TELEPHONE ENCOUNTER
Last Written Prescription Date:  9/25/21  Last Fill Quantity: 0.5ml,  # refills: 6   Last office visit: 8/25/2022 with prescribing provider: Dr. De Luna  Future Office Visit:  2/23/23        Requested Prescriptions   Pending Prescriptions Disp Refills     SOMATULINE DEPOT 120 MG/0.5ML SOLN injection 0.5 mL 6       There is no refill protocol information for this order        Saida Sanders RN

## 2022-08-30 ENCOUNTER — TRANSFERRED RECORDS (OUTPATIENT)
Dept: HEALTH INFORMATION MANAGEMENT | Facility: CLINIC | Age: 62
End: 2022-08-30

## 2022-08-30 LAB
CREATININE (EXTERNAL): 1.1 MG/DL (ref 0.7–1.5)
GFR ESTIMATED (EXTERNAL): 55 ML/MIN/1.73M2 (ref 60–128)
GLUCOSE (EXTERNAL): 162 MG/DL (ref 70–110)
HBA1C MFR BLD: 6.4 % (ref 4.8–5.6)
POTASSIUM (EXTERNAL): 4.1 MMOL/L (ref 3.5–5.5)
TSH SERPL-ACNC: 1.9 MU/L (ref 0.4–5.5)

## 2022-08-30 NOTE — TELEPHONE ENCOUNTER
Message noted, updated Somatuline Rx sent to [facility] pharmacy as requested.    VARGAS De Luna MD, MS  Endocrinology  [facility]

## 2023-02-23 ENCOUNTER — VIRTUAL VISIT (OUTPATIENT)
Dept: ENDOCRINOLOGY | Facility: CLINIC | Age: 63
End: 2023-02-23
Payer: COMMERCIAL

## 2023-02-23 DIAGNOSIS — E89.0 HISTORY OF PARTIAL THYROIDECTOMY: ICD-10-CM

## 2023-02-23 DIAGNOSIS — E23.2 DI (DIABETES INSIPIDUS) (H): ICD-10-CM

## 2023-02-23 DIAGNOSIS — E22.0 ACROMEGALY (H): Primary | ICD-10-CM

## 2023-02-23 PROCEDURE — 99214 OFFICE O/P EST MOD 30 MIN: CPT | Mod: VID | Performed by: INTERNAL MEDICINE

## 2023-02-23 RX ORDER — HYDROCHLOROTHIAZIDE 12.5 MG/1
CAPSULE ORAL
COMMUNITY
Start: 2022-12-12

## 2023-02-23 NOTE — PROGRESS NOTES
Video-Visit Details    Type of service:  Video Visit    Video Start Time (time video started): 10:32 am    Video End Time (time video stopped): 10:55 am    Originating Location (pt. Location): Home        Distant Location (provider location): Off-site    Mode of Communication:  Video Conference via [employer]        Recent issues:  Endocrinology followup evaluation  Previous right hip replacement 1/2021, then 2/2021 right leg DVT's and bilateral PE's in 2/2021, then Xarelto use for 3 months.   Subsequent left hip LISA 1/13/22  She recalls previous lab testing at [facility] Fall '22, but results not available         Pituitary:  1997. 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 Tod Loving/neurosurgery  10/16/97 TSS pituitary surgery at [facility]     Devoloped postop diabetes insipidus (DI)  Postop mild persistent elevation with IGF-1 level, normalized with Octreotide treatment  Has taken Somatuline (somatostatin), ddAVP for treatment     Head MRI imaging includes:  2/10/10 MRI:              Postop changes after TSS              No evidence for recurrent tumor  9/24/13 MRI:              Postop changes following pituitary surgery              Enhancing tissues 0.45 x 1.3 x 1.4 cm              No clear evidence of recurrent tumor  12/19/16 MRI:              Postop changes              Enhancing tissues 0.65 x 0.8 x ~1.4 cm     Other imaging:  Pelvis XRays:               Osteochondroma bone changes  5/23/14 Abdominal U/S:              GB unremarkable without cholethiasis or GB wall thickening  3/2010 Colonoscopy:              Polyp removed, benign tissue  3/2015 Colonoscopy:              No polyps reported     ~10/2019. Vivelle estrogen patch discontinued  Had seen Dr. Tod Izaguirre/[facility]      Previous IGF-1 levels  include:  4/5/19  256  10/11/19 308  10/2/20 165  8/27/21 234 ng/mL (nl )    Recent [facility] labs include:  Lab Results   Component Value Date    PROLACTIN 7 10/02/2020    HGH 0.5 10/02/2020    MARCOS 5.5 10/02/2020    TSH 1.15 10/02/2020    T4 0.93 10/02/2020        Current medications include: Somatuline 120 mg subcutaneous injections to hip area, every Q2 mo cycle       Last dose 1/10/23 at [facility]                 ddAVP 1-spray each morning         Thyroid:  History of multinodular goiter  Surgical evaluation at [facility]  7/2005. Left thyroid lobectomy  Path:  Benign adenomas  No evidence for postop hypothyroidism  No previous treatment with thyroid medication    2/24/21 Thyroid U/S:   Right lobe 5.3 x 3.1 x 3 cm and left lobe surgically absent   Several small (<1 cm) nodules throughout right thyroid lobe    12/19/17 labs:  IGF1 210 ng/mL (nl ), GH 0.9 ng/mL (nl <7.1), glucose 93 mg/dl, prolactin 6.0 ng/mL, TSH 1.77 uIU/mL, FT4 0.95 ng/dL  [facility] 256 ng/mL (nl )    Recent [facility] labs include:  Lab Results   Component Value Date    TSH 1.15 10/02/2020    T4 0.93 10/02/2020           , lives in [place], 2 children Taj and Olivia.  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 Saw Anderson/[facility] for general medicine evaluations.  Also sees Dr. Lucina Bales and also Lucina NAPOLES/[facility]       PMH/PSH:  Past Medical History:   Diagnosis Date     Acromegaly (H)      Breast lump      Cataract      Diabetes insipidus (H)      DVT (deep venous thrombosis) (H) 02/2021    right leg     Kidney infection      Multiple thyroid nodules      TOBY (obstructive sleep apnea)      Osteochondroma      Pulmonary embolus (H) 02/2021    bilateral     Past Surgical History:   Procedure Laterality Date     HYSTERECTOMY TOTAL ABDOMINAL  10/2007     PARTIAL THYROIDECTOMY Left 07/2005     PITUITARY SURGERY  10/1997    TSS  pituitary surgery       Family Hx:  No family history on file.      Social Hx:  Social History     Socioeconomic History     Marital status:      Spouse name: Not on file     Number of children: Not on file     Years of education: Not on file     Highest education level: Not on file   Occupational History     Not on file   Tobacco Use     Smoking status: Never     Smokeless tobacco: Never   Substance and Sexual Activity     Alcohol use: Yes     Drug use: No     Sexual activity: Not on file   Other Topics Concern     Not on file   Social History Narrative     Not on file     Social Determinants of Health     Financial Resource Strain: Not on file   Food Insecurity: Not on file   Transportation Needs: Not on file   Physical Activity: Not on file   Stress: Not on file   Social Connections: Not on file   Intimate Partner Violence: Not on file   Housing Stability: Not on file          MEDICATIONS:  has a current medication list which includes the following prescription(s): amlodipine, desmopressin, escitalopram, hydrochlorothiazide, multivitamin w/minerals, somatuline depot, cholecalciferol, omega-3 acid ethyl esters, and rivaroxaban anticoagulant.       ROS: 10 point ROS neg other than the symptoms noted above in the HPI.     GENERAL:  fatigue, wt stable; denies fevers, chills, night sweats.    HEENT: no dysphagia, odonophagia, diplopia, neck pain  THYROID:  no apparent hyper or hypothyroid symptoms  CV: no chest pain, pressure, palpitations  LUNGS: no SOB, HOPPER, cough, wheezing   ABDOMEN: no diarrhea, constipation, abdominal pain  EXTREMITIES: right foot/ankle/lower leg edema  NEUROLOGY: no headaches, denies changes in vision, tingling, extremitiy numbness   MSK: aches at knees and back; denies muscle weakness  SKIN: no rashes or lesions  :  nocturia 2x/night, also ncreased urination if no ddAVP use; no menses since JOANN/BSO 10/2007  PSYCH:  stable mood, no significant anxiety or depression  ENDOCRINE: no heat  or cold intolerance    Physical Exam (visual exam)  VS:  no vital signs taken for video visit  CONSTITUTIONAL: healthy, alert and NAD, well dressed, answering questions appropriately  ENT: no nose swelling or nasal discharge, mouth redness or gum changes.  EYES: eyes grossly normal to inspection, conjunctivae and sclerae normal, no exophthalmos or proptosis  THYROID:  no apparent nodules or goiter  LUNGS: no audible wheeze, cough or visible cyanosis, no visible retractions or increased work of breathing  ABDOMEN: abdomen not evaluated  EXTREMITIES: no hand tremors, limited exam  NEUROLOGY: CN grossly intact, mentation intact and speech normal   SKIN:  no apparent skin lesions, rash, or edema with visualized skin appearance  PSYCH: mentation appears normal, affect normal/bright, judgement and insight intact,   normal speech and appearance well groomed       LABS:     All pertinent notes, labs, and images personally reviewed by me.     A/P:  Encounter Diagnoses   Name Primary?     Acromegaly (H) Yes     History of partial thyroidectomy      DI (diabetes insipidus) (H)        Comments:   Reviewed health history, pituitary, and thyroid issues.  Difficult to understand the Somatuline treatment at her OBGYN clinic without medical records  Persistent right ankle area edema despite her repeat DVT workup, per patient  Reviewed and interpreted tests that I previously ordered.   Ordered appropriate tests for the endocrinology disease management.    Management options discussed and implemented after shared medical decision making with the patient.  Acromegaly problem is chronic-stable     Plan:  Discussed general issues with acromegaly diagnosis and management  We have reviewed pituitary gland anatomy and hormone physiology  Discussed lab tests used to assess patient pituitary-axis hormone levels  We have discussed the previous pituitary MRI imaging procedure  Reviewed treatment options with the somatostatin and ddAVP  medication use  Reviewed lab testing to screen for hypopituitarism and hypothyroidism     Recommend:  Reviewed the pituitary disease management plan  Continue the Somatuline 120 mg Q 2 month injection dosing at [facility]   Plan next dose soon   Contact our office if questions about Somatuline dosing or Rx  Continue current ddAVP spray QAM but encouraged change to BID dosing  Needs repeat endocrine lab testing   Recommended nearest [facility] clinic, but she prefers [facility] or [facility]   Lab order forms given to patient, diagnoses and our fax number added  No head MRI imaging needed at this time.  Monitor for symptom changes  Will summarize results and recommendations when lab results available    Keep regular general medicine, orthopedic, and GYN appointments  Needs further evaluation of the leg edema and/or vascular medicine consultation  Addressed patient questions today     There are no Patient Instructions on file for this visit.    Future labs ordered today: No orders of the defined types were placed in this encounter.    Radiology/Consults ordered today: None    Total time spent on day of encounter:  31 min    Follow-up:  6/21/23 at 10am, Return    VARGAS De Luna MD, MS  Endocrinology  [facility]

## 2023-03-04 DIAGNOSIS — E23.2 DI (DIABETES INSIPIDUS) (H): ICD-10-CM

## 2023-03-06 RX ORDER — DESMOPRESSIN ACETATE 0.1 MG/ML
SOLUTION NASAL
Qty: 5 ML | Refills: 5 | Status: SHIPPED | OUTPATIENT
Start: 2023-03-06 | End: 2023-10-18

## 2023-03-06 NOTE — TELEPHONE ENCOUNTER
Called patient. Wants to have labs per Dr. Beebe done at outside clinic. Will mail orders to her.   Chris RN, BSN  03/06/23 4:23 PM

## 2023-03-06 NOTE — TELEPHONE ENCOUNTER
Mailed lab orders from Dr. De Luna to patient's address. Stated on lab orders were to have lab results faxed back to us at [phone].      CONNIE Bangura

## 2023-03-06 NOTE — TELEPHONE ENCOUNTER
Requested Prescriptions   Signed Prescriptions Disp Refills    desmopressin (DDAVP) 0.01 % spray 5 mL 5     Sig: USE 1 SPRAY TO NOSTRILS TWICE PER DAY AS DIRECTED       There is no refill protocol information for this order         Last Written Prescription Date:  8/8/2022  Last Fill Quantity: 5mL,  # refills: 5   Last office visit: 8/25/2022 with prescribing provider:  Dr. De Luna 2/23/23, ordered labs (are now past due)  Future Office Visit:  6/21/2023.   Labs have not been completed yet - Will route message to  to call patient to schedule non-fasting labs.   Chris RN, BSN  03/06/23 10:24 AM

## 2023-03-06 NOTE — TELEPHONE ENCOUNTER
Talked with Patient and she mentioned she was waiting on the orders to get mailed to her so she could take them to the [facility] by her house.  She is also looking for Dr. De Luna to renew her prescriptions

## 2023-05-15 ENCOUNTER — TELEPHONE (OUTPATIENT)
Dept: ENDOCRINOLOGY | Facility: CLINIC | Age: 63
End: 2023-05-15
Payer: COMMERCIAL

## 2023-05-15 NOTE — TELEPHONE ENCOUNTER
Prior Authorization Retail Medication Request    Medication/Dose: SOMATULINE DEPOT 120 MG/0.5ML SOLN injection  ICD code (if different than what is on RX):  E22.0      Insurance Name:  [employer] CHOICE   Insurance ID:  [id number]       Pharmacy Information (if different than what is on RX)  Name:  [facility]   Phone:  [phone]

## 2023-06-21 ENCOUNTER — OFFICE VISIT (OUTPATIENT)
Dept: ENDOCRINOLOGY | Facility: CLINIC | Age: 63
End: 2023-06-21
Payer: COMMERCIAL

## 2023-06-21 VITALS
HEART RATE: 60 BPM | SYSTOLIC BLOOD PRESSURE: 143 MMHG | DIASTOLIC BLOOD PRESSURE: 81 MMHG | BODY MASS INDEX: 35.74 KG/M2 | WEIGHT: 242 LBS

## 2023-06-21 DIAGNOSIS — E23.2 DI (DIABETES INSIPIDUS) (H): ICD-10-CM

## 2023-06-21 DIAGNOSIS — R73.03 PREDIABETES: ICD-10-CM

## 2023-06-21 DIAGNOSIS — E22.0 ACROMEGALY (H): Primary | ICD-10-CM

## 2023-06-21 DIAGNOSIS — E89.0 HISTORY OF PARTIAL THYROIDECTOMY: ICD-10-CM

## 2023-06-21 PROCEDURE — 99214 OFFICE O/P EST MOD 30 MIN: CPT | Performed by: INTERNAL MEDICINE

## 2023-06-21 NOTE — PROGRESS NOTES
Recent issues:  Endocrinology followup evaluation  Previous right hip replacement 1/2021, then 2/2021 right leg DVT's and bilateral PE's in 2/2021, then Xarelto use for 3 months.   Subsequent left hip LISA 1/13/22  No new health issues reported         Pituitary:  1997. 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 Tod Loving/neurosurgery  10/16/97 TSS pituitary surgery at [facility]     Devoloped postop diabetes insipidus (DI)  Postop mild persistent elevation with IGF-1 level, normalized with Octreotide treatment  Has taken Somatuline (somatostatin), ddAVP for treatment     Head MRI imaging includes:  2/10/10 MRI:              Postop changes after TSS              No evidence for recurrent tumor  9/24/13 MRI:              Postop changes following pituitary surgery              Enhancing tissues 0.45 x 1.3 x 1.4 cm              No clear evidence of recurrent tumor  12/19/16 MRI:              Postop changes              Enhancing tissues 0.65 x 0.8 x ~1.4 cm     Other imaging:  Pelvis XRays:               Osteochondroma bone changes  5/23/14 Abdominal U/S:              GB unremarkable without cholethiasis or GB wall thickening  3/2010 Colonoscopy:              Polyp removed, benign tissue  3/2015 Colonoscopy:              No polyps reported     ~10/2019. Vivelle estrogen patch discontinued  Had seen Dr. Tod Izaguirre/[facility]      Previous IGF-1 levels include:  4/5/19  256  10/11/19 308  10/2/20 165  8/27/21 234 ng/mL (nl )  8/30/22 201 ng/mL (nl )    Recent  labs include:  Lab Results   Component Value Date    PROLACTIN 7 10/02/2020    HGH 0.5 10/02/2020    MARCOS 5.5 10/02/2020    TSH 1.15 10/02/2020    T4 0.93 10/02/2020        Current medications include: Somatuline 120 mg subcutaneous injections to hip area, every Q2 mo  cycle       Medication shipments to [facility]       Last dose 5/23/23 at [facility]                 ddAVP 1-spray each morning         Thyroid:  History of multinodular goiter  Surgical evaluation at [facility]  7/2005. Left thyroid lobectomy  Path:  Benign adenomas  No evidence for postop hypothyroidism  No previous treatment with thyroid medication    2/24/21 Thyroid U/S:   Right lobe 5.3 x 3.1 x 3 cm and left lobe surgically absent   Several small (<1 cm) nodules throughout right thyroid lobe    Previous labs from other clinics include:  12/19/17  IGF1 210 ng/mL (nl ), GH 0.9 ng/mL (nl <7.1), glucose 93 mg/dl, prolactin 6.0 ng/mL, TSH 1.77 uIU/mL, FT4 0.95 ng/dL  9/27/21 [facility] 256 ng/mL (nl )  8/30/22 TSH 1.9 mIU/mL, FT4 0.92 ng/dL, hgbA1c 6.4%, cortisol 5.4 ng/dL, Ca 9.4 mg/dl    Recent [facility] labs include:  Lab Results   Component Value Date    TSH 1.15 10/02/2020    T4 0.93 10/02/2020           , lives in [place], 2 children Taj and Olivia.  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 Saw Anderson/[facility] for general medicine evaluations.  Also sees Dr. Lucina Bales and also Lucina NAPOLES/[facility]       PMH/PSH:  Past Medical History:   Diagnosis Date     Acromegaly (H)      Breast lump      Cataract      Diabetes insipidus (H)      DVT (deep venous thrombosis) (H) 02/2021    right leg     Kidney infection      Multiple thyroid nodules      TOBY (obstructive sleep apnea)      Osteochondroma      Pulmonary embolus (H) 02/2021    bilateral     Past Surgical History:   Procedure Laterality Date     HYSTERECTOMY TOTAL ABDOMINAL  10/2007     PARTIAL THYROIDECTOMY Left 07/2005     PITUITARY SURGERY  10/1997    TSS pituitary surgery       Family Hx:  No family history on file.      Social Hx:  Social History     Socioeconomic History     Marital status:      Spouse name: Not on file     Number of children:  Not on file     Years of education: Not on file     Highest education level: Not on file   Occupational History     Not on file   Tobacco Use     Smoking status: Never     Smokeless tobacco: Never   Substance and Sexual Activity     Alcohol use: Yes     Drug use: No     Sexual activity: Not on file   Other Topics Concern     Not on file   Social History Narrative     Not on file     Social Determinants of Health     Financial Resource Strain: Not on file   Food Insecurity: Not on file   Transportation Needs: Not on file   Physical Activity: Not on file   Stress: Not on file   Social Connections: Not on file   Intimate Partner Violence: Not on file   Housing Stability: Not on file          MEDICATIONS:  has a current medication list which includes the following prescription(s): amlodipine, desmopressin, escitalopram, hydrochlorothiazide, multivitamin w/minerals, somatuline depot, omega-3 acid ethyl esters, rivaroxaban anticoagulant, and cholecalciferol.       ROS: 10 point ROS neg other than the symptoms noted above in the HPI.     GENERAL:  fatigue, wt stable; denies fevers, chills, night sweats.    HEENT: no dysphagia, odonophagia, diplopia, neck pain  THYROID:  no apparent hyper or hypothyroid symptoms  CV: no chest pain, pressure, palpitations  LUNGS: no SOB, HOPPER, cough, wheezing   ABDOMEN: no diarrhea, constipation, abdominal pain  EXTREMITIES: right foot/ankle/lower leg edema  NEUROLOGY: no headaches, denies changes in vision, tingling, extremitiy numbness   MSK: mid-low back pains, also some knee pains; denies muscle weakness  SKIN: no rashes or lesions  :  nocturia 2x/night, also ncreased urination if no ddAVP use; no menses since JOANN/BSO 10/2007  PSYCH:  stable mood, no significant anxiety or depression  ENDOCRINE: no heat or cold intolerance      Physical Exam   VS: BP (!) 143/81   Pulse 60   Wt 109.8 kg (242 lb)   BMI 35.74 kg/m    GENERAL: AXOX3, NAD, well dressed, answering questions appropriately,  appears stated age.  ENT: prominent mandible and facial bones; no nose swelling or nasal discharge, mouth redness or gum changes.  EYES: eyes grossly normal to inspection, conjunctivae and sclerae normal, no exophthalmos or proptosis  THYROID:  low-horizontal anterior neck scar; no apparent nodules or goiter  CV:  RRR without murmurs  LUNGS: no audible wheeze, cough or visible cyanosis, or increased work of breathing  ABDOMEN: abdomen obese size  EXTREMITIES: some right ankle area edema, large hands  NEUROLOGY: CN grossly intact, no tremors  MSK: grossly intact  SKIN:  scalp hair thinning; no apparent skin lesions, rash, or edema with visualized skin appearance  PSYCH: mentation appears normal, affect normal/bright, judgement and insight intact,   normal speech and appearance well groomed           LABS:     All pertinent notes, labs, and images personally reviewed by me.     A/P:  Encounter Diagnoses   Name Primary?     Acromegaly (H) Yes     DI (diabetes insipidus) (H)      History of partial thyroidectomy      Prediabetes        Comments:   Reviewed health history, pituitary, and thyroid issues.  Difficult to understand the Somatuline treatment at her OBGYN clinic without medical records  Reviewed and interpreted tests that I previously ordered.   Ordered appropriate tests for the endocrinology disease management.    Management options discussed and implemented after shared medical decision making with the patient.  Acromegaly problem is chronic-stable     Plan:  Discussed general issues with acromegaly diagnosis and management  We have reviewed pituitary gland anatomy and hormone physiology  Discussed lab tests used to assess patient pituitary-axis hormone levels  We have discussed the previous pituitary MRI imaging procedure  Reviewed treatment options with the somatostatin and ddAVP medication use  Reviewed lab testing to screen for hypopituitarism and hypothyroidism     Recommend:  Reviewed the pituitary  disease management plan  Continue the Somatuline 120 mg Q 2 month injection dosing at [facility]   Plan next dose in 7/2023   Contact our office if questions about Somatuline dosing or Rx  Continue current ddAVP spray QAM but consider change to BID dosing  Needs repeat endocrine lab testing   Recommended nearest [facility] clinic, but she prefers [facility] or [facility]   Lab order forms given to patient, diagnoses and our fax number added  No head MRI imaging needed at this time.  Monitor for symptom changes  Will summarize results and recommendations when lab results available    Keep regular general medicine, orthopedic, and GYN appointments  Needs further evaluation of the leg edema and/or vascular medicine consultation  Addressed patient questions today     There are no Patient Instructions on file for this visit.    Future labs ordered today:   Orders Placed This Encounter   Procedures     Hemoglobin A1c     TSH     T4 free     Cortisol     Insulin Growth Factor 1 by Immunoassay     Testosterone total     Radiology/Consults ordered today: None    Total time spent on day of encounter:  22 min    Follow-up:  12/2023    VARGAS De Luna MD, MS  Endocrinology  [facility]

## 2023-06-21 NOTE — LETTER
6/21/2023         RE: Ainsa Robles  [id number] [address] [facility] [address]        Dear Colleague,    Thank you for referring your patient, Anisa Robles, to the [facility] [place]. Please see a copy of my visit note below.      Recent issues:  Endocrinology followup evaluation  Previous right hip replacement 1/2021, then 2/2021 right leg DVT's and bilateral PE's in 2/2021, then Xarelto use for 3 months.   Subsequent left hip LISA 1/13/22  No new health issues reported         Pituitary:  1997. 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 Tod Loving/neurosurgery  10/16/97 TSS pituitary surgery at [facility]     Devoloped postop diabetes insipidus (DI)  Postop mild persistent elevation with IGF-1 level, normalized with Octreotide treatment  Has taken Somatuline (somatostatin), ddAVP for treatment     Head MRI imaging includes:  2/10/10 MRI:              Postop changes after TSS              No evidence for recurrent tumor  9/24/13 MRI:              Postop changes following pituitary surgery              Enhancing tissues 0.45 x 1.3 x 1.4 cm              No clear evidence of recurrent tumor  12/19/16 MRI:              Postop changes              Enhancing tissues 0.65 x 0.8 x ~1.4 cm     Other imaging:  Pelvis XRays:               Osteochondroma bone changes  5/23/14 Abdominal U/S:              GB unremarkable without cholethiasis or GB wall thickening  3/2010 Colonoscopy:              Polyp removed, benign tissue  3/2015 Colonoscopy:              No polyps reported     ~10/2019. Vivelle estrogen patch discontinued  Had seen Dr. Tod Izaguirre/[facility]      Previous IGF-1 levels include:  4/5/19  256  10/11/19 308  10/2/20 165  8/27/21 234 ng/mL (nl )  8/30/22 201 ng/mL (nl )    Recent  labs  include:  Lab Results   Component Value Date    PROLACTIN 7 10/02/2020    HGH 0.5 10/02/2020    MARCOS 5.5 10/02/2020    TSH 1.15 10/02/2020    T4 0.93 10/02/2020        Current medications include: Somatuline 120 mg subcutaneous injections to hip area, every Q2 mo cycle       Medication shipments to [facility]       Last dose 5/23/23 at [facility]                 ddAVP 1-spray each morning         Thyroid:  History of multinodular goiter  Surgical evaluation at [facility]  7/2005. Left thyroid lobectomy  Path:  Benign adenomas  No evidence for postop hypothyroidism  No previous treatment with thyroid medication    2/24/21 Thyroid U/S:   Right lobe 5.3 x 3.1 x 3 cm and left lobe surgically absent   Several small (<1 cm) nodules throughout right thyroid lobe    Previous labs from other clinics include:  12/19/17  IGF1 210 ng/mL (nl ), GH 0.9 ng/mL (nl <7.1), glucose 93 mg/dl, prolactin 6.0 ng/mL, TSH 1.77 uIU/mL, FT4 0.95 ng/dL  9/27/21 [facility] 256 ng/mL (nl )  8/30/22 TSH 1.9 mIU/mL, FT4 0.92 ng/dL, hgbA1c 6.4%, cortisol 5.4 ng/dL, Ca 9.4 mg/dl    Recent [facility] labs include:  Lab Results   Component Value Date    TSH 1.15 10/02/2020    T4 0.93 10/02/2020           , lives in [place], 2 children Taj and Olivia.  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 Saw Anderson/[facility] for general medicine evaluations.  Also sees Dr. Lucina Bales and also Lucina NAPOLES/[facility]       PMH/PSH:  Past Medical History:   Diagnosis Date     Acromegaly (H)      Breast lump      Cataract      Diabetes insipidus (H)      DVT (deep venous thrombosis) (H) 02/2021    right leg     Kidney infection      Multiple thyroid nodules      TOBY (obstructive sleep apnea)      Osteochondroma      Pulmonary embolus (H) 02/2021    bilateral     Past Surgical History:   Procedure Laterality Date     HYSTERECTOMY TOTAL ABDOMINAL  10/2007     PARTIAL  THYROIDECTOMY Left 07/2005     PITUITARY SURGERY  10/1997    TSS pituitary surgery       Family Hx:  No family history on file.      Social Hx:  Social History     Socioeconomic History     Marital status:      Spouse name: Not on file     Number of children: Not on file     Years of education: Not on file     Highest education level: Not on file   Occupational History     Not on file   Tobacco Use     Smoking status: Never     Smokeless tobacco: Never   Substance and Sexual Activity     Alcohol use: Yes     Drug use: No     Sexual activity: Not on file   Other Topics Concern     Not on file   Social History Narrative     Not on file     Social Determinants of Health     Financial Resource Strain: Not on file   Food Insecurity: Not on file   Transportation Needs: Not on file   Physical Activity: Not on file   Stress: Not on file   Social Connections: Not on file   Intimate Partner Violence: Not on file   Housing Stability: Not on file          MEDICATIONS:  has a current medication list which includes the following prescription(s): amlodipine, desmopressin, escitalopram, hydrochlorothiazide, multivitamin w/minerals, somatuline depot, omega-3 acid ethyl esters, rivaroxaban anticoagulant, and cholecalciferol.       ROS: 10 point ROS neg other than the symptoms noted above in the HPI.     GENERAL:  fatigue, wt stable; denies fevers, chills, night sweats.    HEENT: no dysphagia, odonophagia, diplopia, neck pain  THYROID:  no apparent hyper or hypothyroid symptoms  CV: no chest pain, pressure, palpitations  LUNGS: no SOB, HOPPER, cough, wheezing   ABDOMEN: no diarrhea, constipation, abdominal pain  EXTREMITIES: right foot/ankle/lower leg edema  NEUROLOGY: no headaches, denies changes in vision, tingling, extremitiy numbness   MSK: mid-low back pains, also some knee pains; denies muscle weakness  SKIN: no rashes or lesions  :  nocturia 2x/night, also ncreased urination if no ddAVP use; no menses since JOANN/BSO  10/2007  PSYCH:  stable mood, no significant anxiety or depression  ENDOCRINE: no heat or cold intolerance      Physical Exam   VS: BP (!) 143/81   Pulse 60   Wt 109.8 kg (242 lb)   BMI 35.74 kg/m    GENERAL: AXOX3, NAD, well dressed, answering questions appropriately, appears stated age.  ENT: prominent mandible and facial bones; no nose swelling or nasal discharge, mouth redness or gum changes.  EYES: eyes grossly normal to inspection, conjunctivae and sclerae normal, no exophthalmos or proptosis  THYROID:  low-horizontal anterior neck scar; no apparent nodules or goiter  CV:  RRR without murmurs  LUNGS: no audible wheeze, cough or visible cyanosis, or increased work of breathing  ABDOMEN: abdomen obese size  EXTREMITIES: some right ankle area edema, large hands  NEUROLOGY: CN grossly intact, no tremors  MSK: grossly intact  SKIN:  scalp hair thinning; no apparent skin lesions, rash, or edema with visualized skin appearance  PSYCH: mentation appears normal, affect normal/bright, judgement and insight intact,   normal speech and appearance well groomed           LABS:     All pertinent notes, labs, and images personally reviewed by me.     A/P:  Encounter Diagnoses   Name Primary?     Acromegaly (H) Yes     DI (diabetes insipidus) (H)      History of partial thyroidectomy      Prediabetes        Comments:   Reviewed health history, pituitary, and thyroid issues.  Difficult to understand the Somatuline treatment at her OBGYN clinic without medical records  Reviewed and interpreted tests that I previously ordered.   Ordered appropriate tests for the endocrinology disease management.    Management options discussed and implemented after shared medical decision making with the patient.  Acromegaly problem is chronic-stable     Plan:  Discussed general issues with acromegaly diagnosis and management  We have reviewed pituitary gland anatomy and hormone physiology  Discussed lab tests used to assess patient  pituitary-axis hormone levels  We have discussed the previous pituitary MRI imaging procedure  Reviewed treatment options with the somatostatin and ddAVP medication use  Reviewed lab testing to screen for hypopituitarism and hypothyroidism     Recommend:  Reviewed the pituitary disease management plan  Continue the Somatuline 120 mg Q 2 month injection dosing at [facility]   Plan next dose in 7/2023   Contact our office if questions about Somatuline dosing or Rx  Continue current ddAVP spray QAM but consider change to BID dosing  Needs repeat endocrine lab testing   Recommended nearest [facility] clinic, but she prefers [facility] or [facility]   Lab order forms given to patient, diagnoses and our fax number added  No head MRI imaging needed at this time.  Monitor for symptom changes  Will summarize results and recommendations when lab results available    Keep regular general medicine, orthopedic, and GYN appointments  Needs further evaluation of the leg edema and/or vascular medicine consultation  Addressed patient questions today     There are no Patient Instructions on file for this visit.    Future labs ordered today:   Orders Placed This Encounter   Procedures     Hemoglobin A1c     TSH     T4 free     Cortisol     Insulin Growth Factor 1 by Immunoassay     Testosterone total     Radiology/Consults ordered today: None    Total time spent on day of encounter:  22 min    Follow-up:  12/2023    VARGAS De Luna MD, MS  Endocrinology  [facility]                        Again, thank you for allowing me to participate in the care of your patient.        Sincerely,        Adithya De Luna MD

## 2023-07-14 NOTE — TELEPHONE ENCOUNTER
PA Initiation    Medication: SOMATULINE DEPOT 120 MG/0.5ML SC SOLN  Insurance Company: [employer] - Phone [phone] Fax [phone]  Pharmacy Filling the Rx: [facility] [address]  Filling Pharmacy Phone: [phone]  Filling Pharmacy Fax: [phone]  Start Date: 7/14/2023

## 2023-07-14 NOTE — TELEPHONE ENCOUNTER
Central Prior Authorization Team   Phone: [phone]      [facility] calling in regards to the PA request.  Rx will be on-hold in the next 48 hours if the case is not completed.     Phone number: [phone]    Case Number [id number]

## 2023-07-18 NOTE — TELEPHONE ENCOUNTER
Prior Authorization Approval    Medication: SOMATULINE DEPOT 120 MG/0.5ML SC SOLN  Authorization Effective Date: 6/14/2023  Authorization Expiration Date: 7/13/2024  Approved Dose/Quantity:   Reference #: [id number]   Insurance Company: [employer] - Phone [phone] Fax [phone]  Expected CoPay:       CoPay Card Available:      Financial Assistance Needed:   Which Pharmacy is filling the prescription: [facility] [address]  Pharmacy Notified: Yes  Patient Notified: Yes **Instructed pharmacy to notify patient when script is ready to /ship.**

## 2023-08-17 DIAGNOSIS — E22.0 ACROMEGALY (H): ICD-10-CM

## 2023-08-18 RX ORDER — LANREOTIDE ACETATE 120 MG/.5ML
INJECTION SUBCUTANEOUS
Qty: 0.5 ML | Refills: 6 | Status: SHIPPED | OUTPATIENT
Start: 2023-08-18 | End: 2024-08-19

## 2023-08-18 NOTE — TELEPHONE ENCOUNTER
"Requested Prescriptions   Pending Prescriptions Disp Refills    SOMATULINE DEPOT 120 MG/0.5ML SOLN injection [Pharmacy Med Name: SOMATULINE DEPOT 120 MG/0.5 ML] 0.5 mL 6     Sig: INJECT 120 MG UNDER THE SKIN EVERY 2 MONTHS AS DIRECTED       There is no refill protocol information for this order        Last Written Prescription Date:  8/29/22  Last Fill Quantity: 0.5mL,  # refills: 6   Last office visit: 6/21/2023 ; last virtual visit: 2/23/2023 with prescribing provider:  Dr De Luna   Future Office Visit:  12/21/23    Per LOV with Dr De Luna (6/21/23)  \"Continue the Somatuline 120 mg Q 2 month injection dosing at [facility]\"    Refill sent.    Taj Moser RN            "

## 2023-10-18 DIAGNOSIS — E23.2 DI (DIABETES INSIPIDUS) (H): ICD-10-CM

## 2023-10-18 RX ORDER — DESMOPRESSIN ACETATE 0.1 MG/ML
SOLUTION NASAL
Qty: 5 ML | Refills: 2 | Status: SHIPPED | OUTPATIENT
Start: 2023-10-18 | End: 2023-12-21

## 2023-10-18 NOTE — TELEPHONE ENCOUNTER
Last Written Prescription Date:  3/6/23  Last Fill Quantity: 5,  # refills: 5   Last office visit: 6/21/2023 with Dr. De Luna  Future Office Visit: 12/21/23        Requested Prescriptions   Pending Prescriptions Disp Refills    desmopressin (DDAVP) 0.01 % spray [Pharmacy Med Name: DESMOPRESSIN 0.1 MG/ML SPRA 0.01 Solution] 5 mL 5     Sig: USE 1 SPRAY TO NOSTRILS TWICE PER DAY AS DIRECTED       There is no refill protocol information for this order        Pt is overdue for labs ordered in June.  Refill sent with note to pharm that pt needs labs.  Saida Sanders RN

## 2023-12-21 ENCOUNTER — OFFICE VISIT (OUTPATIENT)
Dept: ENDOCRINOLOGY | Facility: CLINIC | Age: 63
End: 2023-12-21
Payer: COMMERCIAL

## 2023-12-21 VITALS
DIASTOLIC BLOOD PRESSURE: 78 MMHG | BODY MASS INDEX: 35.34 KG/M2 | SYSTOLIC BLOOD PRESSURE: 128 MMHG | WEIGHT: 239.3 LBS | HEART RATE: 68 BPM

## 2023-12-21 DIAGNOSIS — E89.0 HISTORY OF PARTIAL THYROIDECTOMY: ICD-10-CM

## 2023-12-21 DIAGNOSIS — E22.0 ACROMEGALY (H): Primary | ICD-10-CM

## 2023-12-21 DIAGNOSIS — E23.2 DI (DIABETES INSIPIDUS) (H): ICD-10-CM

## 2023-12-21 DIAGNOSIS — R73.03 PREDIABETES: ICD-10-CM

## 2023-12-21 PROCEDURE — 99214 OFFICE O/P EST MOD 30 MIN: CPT | Performed by: INTERNAL MEDICINE

## 2023-12-21 RX ORDER — DESMOPRESSIN ACETATE 0.1 MG/ML
SOLUTION NASAL
Qty: 5 ML | Refills: 5 | Status: SHIPPED | OUTPATIENT
Start: 2023-12-21

## 2023-12-21 NOTE — TELEPHONE ENCOUNTER
Requested Prescriptions   Pending Prescriptions Disp Refills    desmopressin (DDAVP) 0.01 % spray [Pharmacy Med Name: DESMOPRESSIN 0.1 MG/ML SPRA 0.01 Solution] 5 mL 5     Sig: USE 1 SPRAY TO NOSTRILS TWICE PER DAY AS DIRECTED       There is no refill protocol information for this order        Last Written Prescription Date:  10/18/23  Last Fill Quantity: 5mL,  # refills: 2   Last office visit: 6/21/2023 ; last virtual visit: 2/23/2023 with prescribing provider:  Dr De Luna   Future Office Visit:  12/21/23      Refill sent  Taj Moser RN

## 2023-12-21 NOTE — Clinical Note
12/21/2023         RE: Anisa Robles  [id number] [address] [facility] [address]        Dear Colleague,    Thank you for referring your patient, Anisa Robles, to the [facility] [place]. Please see a copy of my visit note below.      Recent issues:  Endocrinology followup evaluation  Previous right hip replacement 1/2021, then 2/2021 right leg DVT's and bilateral PE's in 2/2021, then Xarelto use for 3 months.   Subsequent left hip LISA 1/13/22  No new health issues reported         Pituitary:  1997. 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 Tod Loving/neurosurgery  10/16/97 TSS pituitary surgery at [facility]     Devoloped postop diabetes insipidus (DI)  Postop mild persistent elevation with IGF-1 level, normalized with Octreotide treatment  Has taken Somatuline (somatostatin), ddAVP for treatment     Head MRI imaging includes:  2/10/10 MRI:              Postop changes after TSS              No evidence for recurrent tumor  9/24/13 MRI:              Postop changes following pituitary surgery              Enhancing tissues 0.45 x 1.3 x 1.4 cm              No clear evidence of recurrent tumor  12/19/16 MRI:              Postop changes              Enhancing tissues 0.65 x 0.8 x ~1.4 cm     Other imaging:  Pelvis XRays:               Osteochondroma bone changes  5/23/14 Abdominal U/S:              GB unremarkable without cholethiasis or GB wall thickening  3/2010 Colonoscopy:              Polyp removed, benign tissue  3/2015 Colonoscopy:              No polyps reported     ~10/2019. Vivelle estrogen patch discontinued  Had seen Dr. Tod [facility]aguirre/[facility]      Previous IGF-1 levels include:  4/5/19  256  10/11/19 308  10/2/20 165  8/27/21 234 ng/mL (nl )  8/30/22 201 ng/mL (nl )    Recent  labs  include:  Lab Results   Component Value Date    PROLACTIN 7 10/02/2020    HGH 0.5 10/02/2020    MARCOS 5.5 10/02/2020    TSH 1.15 10/02/2020    T4 0.93 10/02/2020        Current medications include: Somatuline 120 mg subcutaneous injections to hip area, every Q2 mo cycle       Medication shipments to [facility]       Last dose 11/13/23 at [facility]                 ddAVP 1-spray each morning         Thyroid:  History of multinodular goiter  Surgical evaluation at [facility]  7/2005. Left thyroid lobectomy  Path:  Benign adenomas  No evidence for postop hypothyroidism  No previous treatment with thyroid medication    2/24/21 Thyroid U/S:   Right lobe 5.3 x 3.1 x 3 cm and left lobe surgically absent   Several small (<1 cm) nodules throughout right thyroid lobe    Previous labs from other clinics include:  12/19/17  IGF1 210 ng/mL (nl ), GH 0.9 ng/mL (nl <7.1), glucose 93 mg/dl, prolactin 6.0 ng/mL, TSH 1.77 uIU/mL, FT4 0.95 ng/dL  9/27/21 [facility] 256 ng/mL (nl )  8/30/22 TSH 1.9 mIU/mL, FT4 0.92 ng/dL, hgbA1c 6.4%, cortisol 5.4 ng/dL, Ca 9.4 mg/dl    Recent [facility] labs include:  Lab Results   Component Value Date    TSH 1.15 10/02/2020    T4 0.93 10/02/2020           , lives in [place], 2 children Taj and Olivia.  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 Saw Anderson/[facility] for general medicine evaluations.  Also sees Dr. Lucina Bales and also Lucina NAPOLES/[facility]       PMH/PSH:  Past Medical History:   Diagnosis Date    Acromegaly (H)     Breast lump     Cataract     Diabetes insipidus (H24)     DVT (deep venous thrombosis) (H) 02/2021    right leg    Kidney infection     Multiple thyroid nodules     TOBY (obstructive sleep apnea)     Osteochondroma     Pulmonary embolus (H) 02/2021    bilateral     Past Surgical History:   Procedure Laterality Date    HYSTERECTOMY TOTAL ABDOMINAL  10/2007    PARTIAL  THYROIDECTOMY Left 07/2005    PITUITARY SURGERY  10/1997    TSS pituitary surgery       Family Hx:  No family history on file.      Social Hx:  Social History     Socioeconomic History    Marital status:      Spouse name: Not on file    Number of children: Not on file    Years of education: Not on file    Highest education level: Not on file   Occupational History    Not on file   Tobacco Use    Smoking status: Never    Smokeless tobacco: Never   Substance and Sexual Activity    Alcohol use: Yes    Drug use: No    Sexual activity: Not on file   Other Topics Concern    Not on file   Social History Narrative    Not on file     Social Determinants of Health     Financial Resource Strain: Not on file   Food Insecurity: Not on file   Transportation Needs: Not on file   Physical Activity: Not on file   Stress: Not on file   Social Connections: Not on file   Interpersonal Safety: Not on file   Housing Stability: Not on file          MEDICATIONS:  has a current medication list which includes the following prescription(s): amlodipine, desmopressin, escitalopram, hydrochlorothiazide, multivitamin w/minerals, omega-3 acid ethyl esters, somatuline depot, cholecalciferol, and rivaroxaban anticoagulant.       ROS: 10 point ROS neg other than the symptoms noted above in the HPI.     GENERAL:  fatigue, wt stable; denies fevers, chills, night sweats.    HEENT: no dysphagia, odonophagia, diplopia, neck pain  THYROID:  no apparent hyper or hypothyroid symptoms  CV: no chest pain, pressure, palpitations  LUNGS: no SOB, HOPPER, cough, wheezing   ABDOMEN: no diarrhea, constipation, abdominal pain  EXTREMITIES: right foot/ankle/lower leg edema  NEUROLOGY: no headaches, denies changes in vision, tingling, extremitiy numbness   MSK: mid-low back pains, also some knee pains; denies muscle weakness  SKIN: no rashes or lesions  :  nocturia 2x/night, also ncreased urination if no ddAVP use; no menses since JOANN/BSO 10/2007  PSYCH:  stable  mood, no significant anxiety or depression  ENDOCRINE: no heat or cold intolerance      Physical Exam   VS: /78   Pulse 68   Wt 108.5 kg (239 lb 4.8 oz)   BMI 35.34 kg/m    GENERAL: AXOX3, NAD, well dressed, answering questions appropriately, appears stated age.  ENT: prominent mandible and facial bones; no nose swelling or nasal discharge, mouth redness or gum changes.  EYES: eyes grossly normal to inspection, conjunctivae and sclerae normal, no exophthalmos or proptosis  THYROID:  low-horizontal anterior neck scar; no apparent nodules or goiter  CV:  RRR without murmurs  LUNGS: no audible wheeze, cough or visible cyanosis, or increased work of breathing  ABDOMEN: abdomen obese size  EXTREMITIES: some right ankle area edema, large hands  NEUROLOGY: CN grossly intact, no tremors  MSK: grossly intact  SKIN:  scalp hair thinning; no apparent skin lesions, rash, or edema with visualized skin appearance  PSYCH: mentation appears normal, affect normal/bright, judgement and insight intact,   normal speech and appearance well groomed           LABS:     All pertinent notes, labs, and images personally reviewed by me.     A/P:  Encounter Diagnoses   Name Primary?    Acromegaly (H) Yes    History of partial thyroidectomy     Prediabetes          Comments:   Reviewed health history, pituitary, and thyroid issues.  Difficult to understand the Somatuline treatment at her OBGYN clinic without medical records  Reviewed and interpreted tests that I previously ordered.   Ordered appropriate tests for the endocrinology disease management.    Management options discussed and implemented after shared medical decision making with the patient.  Acromegaly problem is chronic-stable     Plan:  Discussed general issues with acromegaly diagnosis and management  We have reviewed pituitary gland anatomy and hormone physiology  Discussed lab tests used to assess patient pituitary-axis hormone levels  We have discussed the previous  pituitary MRI imaging procedure  Reviewed treatment options with the somatostatin and ddAVP medication use  Reviewed lab testing to screen for hypopituitarism and hypothyroidism     Recommend:  Reviewed the pituitary disease management plan  Continue the Somatuline 120 mg Q 2 month injection dosing at [facility]   Plan next dose in 1/2024   Contact our office if questions about Somatuline dosing or Rx  Continue current ddAVP spray QAM but consider change to BID dosing  Needs repeat endocrine lab testing   Recommended nearest [facility] clinic, but she prefers [facility] or [facility] [place]   Lab order forms given to patient, diagnoses and our fax number added  No head MRI imaging needed at this time.  Monitor for symptom changes  Will summarize results and recommendations when lab results available    Keep regular general medicine, orthopedic, and GYN appointments  Needs further evaluation of the leg edema and/or vascular medicine consultation  Addressed patient questions today     There are no Patient Instructions on file for this visit.    Future labs ordered today:   Orders Placed This Encounter   Procedures    TSH    T4 free    Insulin Growth Factor 1 by Immunoassay    Follicle stimulating hormone    Prolactin    Cortisol    Hemoglobin A1c     Radiology/Consults ordered today: None    Total time spent on day of encounter:  ***    Follow-up:  7/2024, Return    VARGAS De Luna MD, MS  Endocrinology  [facility]                          Recent issues:  Endocrinology followup evaluation  Previous right LISA 1/2021, then 2/2021 right leg DVT's and bilateral PE's in 2/2021; subsequent left hip LISA 1/13/22  Last Somatuline dose 11/13/23  No new health issues reported         Pituitary:  1997. 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 Baron  [place]/neurosurgery  10/16/97 TSS pituitary surgery at [facility]     Devoloped postop diabetes insipidus (DI)  Postop mild persistent elevation with IGF-1 level, normalized with Octreotide treatment  Has taken Somatuline (somatostatin), ddAVP for treatment     Head MRI imaging includes:  2/10/10 MRI:              Postop changes after TSS              No evidence for recurrent tumor  9/24/13 MRI:              Postop changes following pituitary surgery              Enhancing tissues 0.45 x 1.3 x 1.4 cm              No clear evidence of recurrent tumor  12/19/16 MRI:              Postop changes              Enhancing tissues 0.65 x 0.8 x ~1.4 cm     Other imaging:  Pelvis XRays:               Osteochondroma bone changes  5/23/14 Abdominal U/S:              GB unremarkable without cholethiasis or GB wall thickening  3/2010 Colonoscopy:              Polyp removed, benign tissue  3/2015 Colonoscopy:              No polyps reported     ~10/2019. Vivelle estrogen patch discontinued  Had seen Dr. Tod Izaguirre/[facility]      Previous IGF-1 levels include:  4/5/19  256  10/11/19 308  10/2/20 165  8/27/21 234 ng/mL (nl )  8/30/22 201 ng/mL (nl )      Recent  labs include:  Lab Results   Component Value Date    PROLACTIN 7 10/02/2020    HGH 0.5 10/02/2020    MARCOS 5.5 10/02/2020    TSH 1.15 10/02/2020    T4 0.93 10/02/2020        Current medications include: Somatuline 120 mg subcutaneous injections to hip area, every Q2 mo cycle       Medication shipments to [facility]       Last dose 11/13/23 at [facility]                 ddAVP 1-spray each morning         Thyroid:  History of multinodular goiter  Surgical evaluation at [facility]  7/2005. Left thyroid lobectomy  Path:  Benign adenomas  No evidence for postop hypothyroidism  No previous treatment with thyroid medication    2/24/21 Thyroid U/S:   Right lobe 5.3 x 3.1 x 3 cm and left lobe surgically absent   Several  small (<1 cm) nodules throughout right thyroid lobe    Previous labs from other clinics include:  12/19/17  IGF1 210 ng/mL (nl ), GH 0.9 ng/mL (nl <7.1), glucose 93 mg/dl, prolactin 6.0 ng/mL, TSH 1.77 uIU/mL, FT4 0.95 ng/dL  9/27/21 [facility] 256 ng/mL (nl )  8/30/22 TSH 1.9 mIU/mL, FT4 0.92 ng/dL, hgbA1c 6.4%, cortisol 5.4 ng/dL, Ca 9.4 mg/dl    Recent [facility] labs include:  Lab Results   Component Value Date    TSH 1.15 10/02/2020    T4 0.93 10/02/2020           , lives in [place], 2 children Taj and Olivia.  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 Saw Anderson/[facility] for general medicine evaluations.  Also sees Dr. Lucina Bales and also Lucina NAPOLES/[facility]       PMH/PSH:  Past Medical History:   Diagnosis Date     Acromegaly (H)      Breast lump      Cataract      Diabetes insipidus (H24)      DVT (deep venous thrombosis) (H) 02/2021    right leg     Kidney infection      Multiple thyroid nodules      TOBY (obstructive sleep apnea)      Osteochondroma      Pulmonary embolus (H) 02/2021    bilateral     Past Surgical History:   Procedure Laterality Date     HYSTERECTOMY TOTAL ABDOMINAL  10/2007     PARTIAL THYROIDECTOMY Left 07/2005     PITUITARY SURGERY  10/1997    TSS pituitary surgery       Family Hx:  No family history on file.      Social Hx:  Social History     Socioeconomic History     Marital status:      Spouse name: Not on file     Number of children: Not on file     Years of education: Not on file     Highest education level: Not on file   Occupational History     Not on file   Tobacco Use     Smoking status: Never     Smokeless tobacco: Never   Substance and Sexual Activity     Alcohol use: Yes     Drug use: No     Sexual activity: Not on file   Other Topics Concern     Not on file   Social History Narrative     Not on file     Social Determinants of Health     Financial Resource Strain: Not on file   Food  Insecurity: Not on file   Transportation Needs: Not on file   Physical Activity: Not on file   Stress: Not on file   Social Connections: Not on file   Interpersonal Safety: Not on file   Housing Stability: Not on file          MEDICATIONS:  has a current medication list which includes the following prescription(s): amlodipine, desmopressin, escitalopram, hydrochlorothiazide, multivitamin w/minerals, omega-3 acid ethyl esters, somatuline depot, cholecalciferol, and rivaroxaban anticoagulant.       ROS: 10 point ROS neg other than the symptoms noted above in the HPI.     GENERAL:  fatigue, wt stable; denies fevers, chills, night sweats.    HEENT: no dysphagia, odonophagia, diplopia, neck pain  THYROID:  no apparent hyper or hypothyroid symptoms  CV: no chest pain, pressure, palpitations  LUNGS: no SOB, HOPPER, cough, wheezing   ABDOMEN: no diarrhea, constipation, abdominal pain  EXTREMITIES: right foot/ankle/lower leg edema  NEUROLOGY: no headaches, denies changes in vision, tingling, extremitiy numbness   MSK: mid-low back pains, also some knee pains; denies muscle weakness  SKIN: no rashes or lesions  :  nocturia 2x/night, also ncreased urination if no ddAVP use; no menses since JOANN/BSO 10/2007  PSYCH:  stable mood, no significant anxiety or depression  ENDOCRINE: no heat or cold intolerance      Physical Exam   VS: /78   Pulse 68   Wt 108.5 kg (239 lb 4.8 oz)   BMI 35.34 kg/m    GENERAL: AXOX3, NAD, well dressed, answering questions appropriately, appears stated age.  ENT: prominent mandible and facial bones; no nose swelling or nasal discharge, mouth redness or gum changes.  EYES: eyes grossly normal to inspection, conjunctivae and sclerae normal, no exophthalmos or proptosis  THYROID:  low-horizontal anterior neck scar; no apparent nodules or goiter  CV:  RRR without murmurs  LUNGS: no audible wheeze, cough or visible cyanosis, or increased work of breathing  ABDOMEN: abdomen obese size  EXTREMITIES: some  right ankle area edema, large hands  NEUROLOGY: CN grossly intact, no tremors  MSK: grossly intact  SKIN:  scalp hair thinning; no apparent skin lesions, rash, or edema with visualized skin appearance  PSYCH: mentation appears normal, affect normal/bright, judgement and insight intact,   normal speech and appearance well groomed    LABS:     All pertinent notes, labs, and images personally reviewed by me.     A/P:  Encounter Diagnoses   Name Primary?     Acromegaly (H) Yes     History of partial thyroidectomy      Prediabetes        Comments:   Reviewed health history, pituitary, and thyroid issues.  Difficult to understand the Somatuline treatment at her [facility] clinic without medical records  Reviewed and interpreted tests that I previously ordered.   Ordered appropriate tests for the endocrinology disease management.    Management options discussed and implemented after shared medical decision making with the patient.  Acromegaly problem is chronic-stable     Plan:  Discussed general issues with acromegaly diagnosis and management  We have reviewed pituitary gland anatomy and hormone physiology  Discussed lab tests used to assess patient pituitary-axis hormone levels  We have discussed the previous pituitary MRI imaging procedure  Reviewed treatment options with the somatostatin and ddAVP medication use  Reviewed lab testing to screen for hypopituitarism and hypothyroidism     Recommend:  Reviewed the pituitary disease management plan  Continue the Somatuline 120 mg Q 2 month injection dosing at [facility]   Plan next dose in 1/2024   Contact our office if questions about Somatuline dosing or Rx  Continue current ddAVP spray QAM but consider change to BID dosing  Needs repeat endocrine lab testing   Recommended nearest [facility] clinic, but she prefers [facility] or [facility]   Printed lab orders given to patient, diagnoses and our fax number added  No head MRI imaging needed at this  time.  Monitor for symptom changes  Will summarize results and recommendations when lab results available    Keep regular general medicine, orthopedic, and GYN appointments  Needs further evaluation of the leg edema and/or vascular medicine consultation  Addressed patient questions today     There are no Patient Instructions on file for this visit.    Future labs ordered today:   Orders Placed This Encounter   Procedures     TSH     T4 free     Insulin Growth Factor 1 by Immunoassay     Follicle stimulating hormone     Prolactin     Cortisol     Hemoglobin A1c     Radiology/Consults ordered today: None    Total time spent on day of encounter:  22 min    Follow-up:  7/2024, Return    VARGAS De Luna MD, MS  Endocrinology  [facility]                          Again, thank you for allowing me to participate in the care of your patient.        Sincerely,        Adithya De Luna MD

## 2023-12-21 NOTE — PROGRESS NOTES
Recent issues:  Endocrinology followup evaluation  Previous right LISA 1/2021, then 2/2021 right leg DVT's and bilateral PE's in 2/2021; subsequent left hip LISA 1/13/22  Last Somatuline dose 11/13/23  No new health issues reported         Pituitary:  1997. 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 Tod Loving/neurosurgery  10/16/97 TSS pituitary surgery at [facility]     Devoloped postop diabetes insipidus (DI)  Postop mild persistent elevation with IGF-1 level, normalized with Octreotide treatment  Has taken Somatuline (somatostatin), ddAVP for treatment     Head MRI imaging includes:  2/10/10 MRI:              Postop changes after TSS              No evidence for recurrent tumor  9/24/13 MRI:              Postop changes following pituitary surgery              Enhancing tissues 0.45 x 1.3 x 1.4 cm              No clear evidence of recurrent tumor  12/19/16 MRI:              Postop changes              Enhancing tissues 0.65 x 0.8 x ~1.4 cm     Other imaging:  Pelvis XRays:               Osteochondroma bone changes  5/23/14 Abdominal U/S:              GB unremarkable without cholethiasis or GB wall thickening  3/2010 Colonoscopy:              Polyp removed, benign tissue  3/2015 Colonoscopy:              No polyps reported     ~10/2019. Vivelle estrogen patch discontinued  Had seen Dr. Tod Izaguirre/[facility]      Previous IGF-1 levels include:  4/5/19  256  10/11/19 308  10/2/20 165  8/27/21 234 ng/mL (nl )  8/30/22 201 ng/mL (nl )      Recent  labs include:  Lab Results   Component Value Date    PROLACTIN 7 10/02/2020    HGH 0.5 10/02/2020    MARCOS 5.5 10/02/2020    TSH 1.15 10/02/2020    T4 0.93 10/02/2020        Current medications include: Somatuline 120 mg subcutaneous injections to hip area, every Q2 mo  cycle       Medication shipments to [facility]       Last dose 11/13/23 at [facility]                 ddAVP 1-spray each morning         Thyroid:  History of multinodular goiter  Surgical evaluation at [facility]  7/2005. Left thyroid lobectomy  Path:  Benign adenomas  No evidence for postop hypothyroidism  No previous treatment with thyroid medication    2/24/21 Thyroid U/S:   Right lobe 5.3 x 3.1 x 3 cm and left lobe surgically absent   Several small (<1 cm) nodules throughout right thyroid lobe    Previous labs from other clinics include:  12/19/17  IGF1 210 ng/mL (nl ), GH 0.9 ng/mL (nl <7.1), glucose 93 mg/dl, prolactin 6.0 ng/mL, TSH 1.77 uIU/mL, FT4 0.95 ng/dL  9/27/21 [facility] 256 ng/mL (nl )  8/30/22 TSH 1.9 mIU/mL, FT4 0.92 ng/dL, hgbA1c 6.4%, cortisol 5.4 ng/dL, Ca 9.4 mg/dl    Recent [facility] labs include:  Lab Results   Component Value Date    TSH 1.15 10/02/2020    T4 0.93 10/02/2020           , lives in [place], 2 children Taj and Olivia.  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 Saw Anderson/[facility] for general medicine evaluations.  Also sees Dr. Lucina Bales and also Lucina NAPOLES/[facility]       PMH/PSH:  Past Medical History:   Diagnosis Date    Acromegaly (H)     Breast lump     Cataract     Diabetes insipidus (H24)     DVT (deep venous thrombosis) (H) 02/2021    right leg    Kidney infection     Multiple thyroid nodules     TOBY (obstructive sleep apnea)     Osteochondroma     Pulmonary embolus (H) 02/2021    bilateral     Past Surgical History:   Procedure Laterality Date    HYSTERECTOMY TOTAL ABDOMINAL  10/2007    PARTIAL THYROIDECTOMY Left 07/2005    PITUITARY SURGERY  10/1997    TSS pituitary surgery       Family Hx:  No family history on file.      Social Hx:  Social History     Socioeconomic History    Marital status:      Spouse name: Not on file    Number of children: Not on file     Years of education: Not on file    Highest education level: Not on file   Occupational History    Not on file   Tobacco Use    Smoking status: Never    Smokeless tobacco: Never   Substance and Sexual Activity    Alcohol use: Yes    Drug use: No    Sexual activity: Not on file   Other Topics Concern    Not on file   Social History Narrative    Not on file     Social Determinants of Health     Financial Resource Strain: Not on file   Food Insecurity: Not on file   Transportation Needs: Not on file   Physical Activity: Not on file   Stress: Not on file   Social Connections: Not on file   Interpersonal Safety: Not on file   Housing Stability: Not on file          MEDICATIONS:  has a current medication list which includes the following prescription(s): amlodipine, desmopressin, escitalopram, hydrochlorothiazide, multivitamin w/minerals, omega-3 acid ethyl esters, somatuline depot, cholecalciferol, and rivaroxaban anticoagulant.       ROS: 10 point ROS neg other than the symptoms noted above in the HPI.     GENERAL:  fatigue, wt stable; denies fevers, chills, night sweats.    HEENT: no dysphagia, odonophagia, diplopia, neck pain  THYROID:  no apparent hyper or hypothyroid symptoms  CV: no chest pain, pressure, palpitations  LUNGS: no SOB, HOPPER, cough, wheezing   ABDOMEN: no diarrhea, constipation, abdominal pain  EXTREMITIES: right foot/ankle/lower leg edema  NEUROLOGY: no headaches, denies changes in vision, tingling, extremitiy numbness   MSK: mid-low back pains, also some knee pains; denies muscle weakness  SKIN: no rashes or lesions  :  nocturia 2x/night, also ncreased urination if no ddAVP use; no menses since JOANN/BSO 10/2007  PSYCH:  stable mood, no significant anxiety or depression  ENDOCRINE: no heat or cold intolerance      Physical Exam   VS: /78   Pulse 68   Wt 108.5 kg (239 lb 4.8 oz)   BMI 35.34 kg/m    GENERAL: AXOX3, NAD, well dressed, answering questions appropriately, appears stated  age.  ENT: prominent mandible and facial bones; no nose swelling or nasal discharge, mouth redness or gum changes.  EYES: eyes grossly normal to inspection, conjunctivae and sclerae normal, no exophthalmos or proptosis  THYROID:  low-horizontal anterior neck scar; no apparent nodules or goiter  CV:  RRR without murmurs  LUNGS: no audible wheeze, cough or visible cyanosis, or increased work of breathing  ABDOMEN: abdomen obese size  EXTREMITIES: some right ankle area edema, large hands  NEUROLOGY: CN grossly intact, no tremors  MSK: grossly intact  SKIN:  scalp hair thinning; no apparent skin lesions, rash, or edema with visualized skin appearance  PSYCH: mentation appears normal, affect normal/bright, judgement and insight intact,   normal speech and appearance well groomed    LABS:     All pertinent notes, labs, and images personally reviewed by me.     A/P:  Encounter Diagnoses   Name Primary?    Acromegaly (H) Yes    History of partial thyroidectomy     Prediabetes        Comments:   Reviewed health history, pituitary, and thyroid issues.  Difficult to understand the Somatuline treatment at her OBGYN clinic without medical records  Reviewed and interpreted tests that I previously ordered.   Ordered appropriate tests for the endocrinology disease management.    Management options discussed and implemented after shared medical decision making with the patient.  Acromegaly problem is chronic-stable     Plan:  Discussed general issues with acromegaly diagnosis and management  We have reviewed pituitary gland anatomy and hormone physiology  Discussed lab tests used to assess patient pituitary-axis hormone levels  We have discussed the previous pituitary MRI imaging procedure  Reviewed treatment options with the somatostatin and ddAVP medication use  Reviewed lab testing to screen for hypopituitarism and hypothyroidism     Recommend:  Reviewed the pituitary disease management plan  Continue the Somatuline 120 mg Q 2  month injection dosing at [facility]   Plan next dose in 1/2024   Contact our office if questions about Somatuline dosing or Rx  Continue current ddAVP spray QAM but consider change to BID dosing  Needs repeat endocrine lab testing   Recommended nearest [facility] clinic, but she prefers [facility] or [facility]   Printed lab orders given to patient, diagnoses and our fax number added  No head MRI imaging needed at this time.  Monitor for symptom changes  Will summarize results and recommendations when lab results available    Keep regular general medicine, orthopedic, and GYN appointments  Needs further evaluation of the leg edema and/or vascular medicine consultation  Addressed patient questions today     There are no Patient Instructions on file for this visit.    Future labs ordered today:   Orders Placed This Encounter   Procedures    TSH    T4 free    Insulin Growth Factor 1 by Immunoassay    Follicle stimulating hormone    Prolactin    Cortisol    Hemoglobin A1c     Radiology/Consults ordered today: None    Total time spent on day of encounter:  22 min    Follow-up:  7/2024, Return    VARGAS De Luna MD, MS  Endocrinology  [facility]

## 2024-01-05 ENCOUNTER — TRANSFERRED RECORDS (OUTPATIENT)
Dept: HEALTH INFORMATION MANAGEMENT | Facility: CLINIC | Age: 64
End: 2024-01-05
Payer: COMMERCIAL

## 2024-01-05 LAB — HBA1C MFR BLD: 6.2 % (ref 4.8–5.6)

## 2024-07-25 ENCOUNTER — OFFICE VISIT (OUTPATIENT)
Dept: ENDOCRINOLOGY | Facility: CLINIC | Age: 64
End: 2024-07-25
Payer: COMMERCIAL

## 2024-07-25 VITALS
WEIGHT: 243 LBS | HEART RATE: 63 BPM | SYSTOLIC BLOOD PRESSURE: 126 MMHG | DIASTOLIC BLOOD PRESSURE: 75 MMHG | BODY MASS INDEX: 35.88 KG/M2

## 2024-07-25 DIAGNOSIS — E89.0 HISTORY OF PARTIAL THYROIDECTOMY: ICD-10-CM

## 2024-07-25 DIAGNOSIS — R73.03 PREDIABETES: ICD-10-CM

## 2024-07-25 DIAGNOSIS — E22.0 ACROMEGALY (H): Primary | ICD-10-CM

## 2024-07-25 PROCEDURE — G2211 COMPLEX E/M VISIT ADD ON: HCPCS | Performed by: INTERNAL MEDICINE

## 2024-07-25 PROCEDURE — 99214 OFFICE O/P EST MOD 30 MIN: CPT | Performed by: INTERNAL MEDICINE

## 2024-07-25 RX ORDER — ROSUVASTATIN CALCIUM 10 MG/1
1 TABLET, COATED ORAL
COMMUNITY
Start: 2024-06-26

## 2024-07-25 NOTE — PROGRESS NOTES
Recent issues:  Endocrinology followup evaluation  Previous right LISA 1/2021, then 2/2021 right leg DVT's and bilateral PE's in 2/2021; subsequent left hip LISA 1/13/22  Last Somatuline dose 5/13/24  Patient reports fatigue, but no new health issues         Pituitary:  1997. 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 Tod Loving/neurosurgery  10/16/97 TSS pituitary surgery at [facility]     Devoloped postop diabetes insipidus (DI)  Postop mild persistent elevation with IGF-1 level, normalized with Octreotide treatment  Has taken Somatuline (somatostatin), ddAVP for treatment     Head MRI imaging includes:  2/10/10 MRI:              Postop changes after TSS              No evidence for recurrent tumor  9/24/13 MRI:              Postop changes following pituitary surgery              Enhancing tissues 0.45 x 1.3 x 1.4 cm              No clear evidence of recurrent tumor  12/19/16 MRI:              Postop changes              Enhancing tissues 0.65 x 0.8 x ~1.4 cm     Other imaging:  Pelvis XRays:               Osteochondroma bone changes  5/23/14 Abdominal U/S:              GB unremarkable without cholethiasis or GB wall thickening  3/2010 Colonoscopy:              Polyp removed, benign tissue  3/2015 Colonoscopy:              No polyps reported     ~10/2019. Vivelle estrogen patch discontinued  Had seen Dr. Tod Izaguirre/[facility]      Previous IGF-1 levels include:  4/5/19  256  10/11/19 308  10/2/20 165  8/27/21 234 ng/mL (nl )  8/30/22 201 ng/mL (nl )  1/5/24  205 ng/mL      Recent  labs include:  Lab Results   Component Value Date    PROLACTIN 7 10/02/2020    HGH 0.5 10/02/2020    MARCOS 5.5 10/02/2020    TSH 1.15 10/02/2020    T4 0.93 10/02/2020      Current medications include: Somatuline 120 mg subcutaneous injections to  hip area, every Q2 mo cycle       Medication shipments/dosing at [facility], last dose 5/13/24                 ddAVP 1-spray each morning         Thyroid:  History of multinodular goiter  Surgical evaluation at [facility]  7/2005. Left thyroid lobectomy  Path:  Benign adenomas  No evidence for postop hypothyroidism  No previous treatment with thyroid medication    2/24/21 Thyroid U/S:   Right lobe 5.3 x 3.1 x 3 cm and left lobe surgically absent   Several small (<1 cm) nodules throughout right thyroid lobe    Previous labs from other clinics include:  12/19/17  IGF1 210 ng/mL (nl ), GH 0.9 ng/mL (nl <7.1), glucose 93 mg/dl, prolactin 6.0 ng/mL, TSH 1.77 uIU/mL, FT4 0.95 ng/dL  9/27/21 [facility] 256 ng/mL (nl )  8/30/22 TSH 1.9 mIU/mL, FT4 0.92 ng/dL, hgbA1c 6.4%, cortisol 5.4 ng/dL, Ca 9.4 mg/dl  Recent [facility] labs include:  Lab Results   Component Value Date    TSH 1.15 10/02/2020    T4 0.93 10/02/2020           , lives in [place], 2 children Taj and Olivia.  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 Saw Anderson/[facility] for general medicine evaluations.  Also sees Dr. Lucina Bales and also Lucina NAPOLES/[facility]       PMH/PSH:  Past Medical History:   Diagnosis Date    Acromegaly (H)     Breast lump     Cataract     Diabetes insipidus (H24)     DVT (deep venous thrombosis) (H) 02/2021    right leg    Kidney infection     Multiple thyroid nodules     TOBY (obstructive sleep apnea)     Osteochondroma     Pulmonary embolus (H) 02/2021    bilateral     Past Surgical History:   Procedure Laterality Date    HYSTERECTOMY TOTAL ABDOMINAL  10/2007    PARTIAL THYROIDECTOMY Left 07/2005    PITUITARY SURGERY  10/1997    TSS pituitary surgery       Family Hx:  No family history on file.      Social Hx:  Social History     Socioeconomic History    Marital status:      Spouse name: Not on file    Number of children: Not on  file    Years of education: Not on file    Highest education level: Not on file   Occupational History    Not on file   Tobacco Use    Smoking status: Never    Smokeless tobacco: Never   Substance and Sexual Activity    Alcohol use: Yes    Drug use: No    Sexual activity: Not on file   Other Topics Concern    Not on file   Social History Narrative    Not on file     Social Determinants of Health     Financial Resource Strain: Not on file   Food Insecurity: Not on file   Transportation Needs: Not on file   Physical Activity: Not on file   Stress: Not on file   Social Connections: Not on file   Interpersonal Safety: Not At Risk (1/9/2024)    Received from [facility]    Humiliation, Afraid, Rape, and Kick questionnaire     Fear of Current or Ex-Partner: No     Emotionally Abused: No     Physically Abused: No     Sexually Abused: No   Housing Stability: Not on file          MEDICATIONS:  has a current medication list which includes the following prescription(s): amlodipine, cyanocobalamin, desmopressin, escitalopram, hydrochlorothiazide, multivitamin w/minerals, rosuvastatin, somatuline depot, cholecalciferol, omega-3 acid ethyl esters, and rivaroxaban anticoagulant.       ROS: 10 point ROS neg other than the symptoms noted above in the HPI.     GENERAL: some fatigue, wt stable; denies fevers, chills, night sweats.    HEENT: no dysphagia, odonophagia, diplopia, neck pain  THYROID:  no apparent hyper or hypothyroid symptoms  CV: no chest pain, pressure, palpitations  LUNGS: no SOB, HOPPER, cough, wheezing   ABDOMEN: no diarrhea, constipation, abdominal pain  EXTREMITIES: right foot/ankle/lower leg edema  NEUROLOGY: no headaches, denies changes in vision, tingling, extremitiy numbness   MSK: mid-low back pains, also some knee pains; denies muscle weakness  SKIN: no rashes or lesions  :  nocturia 2x/night, also ncreased urination if no ddAVP use; no menses since JOANN/BSO 10/2007  PSYCH:   stable mood, no significant anxiety or depression  ENDOCRINE: no heat or cold intolerance      Physical Exam   VS: /75   Pulse 63   Wt 110.2 kg (243 lb)   BMI 35.88 kg/m    GENERAL: AXOX3, NAD, well dressed, answering questions appropriately, appears stated age.  ENT: prominent mandible and facial bones; no nose swelling or nasal discharge, mouth redness or gum changes.  EYES: eyes grossly normal to inspection, conjunctivae and sclerae normal, no exophthalmos or proptosis  THYROID:  low-horizontal anterior neck scar; no apparent nodules or goiter  CV:  RRR without murmurs  LUNGS: no audible wheeze, cough or visible cyanosis, or increased work of breathing  ABDOMEN: abdomen obese size  EXTREMITIES: deviated alignment right distal index finger, some right ankle area edema, large hands  NEUROLOGY: CN grossly intact, no tremors  MSK: grossly intact  SKIN:  scalp hair thinning; no apparent skin lesions, rash, or edema with visualized skin appearance  PSYCH: mentation appears normal, affect normal/bright, judgement and insight intact,   normal speech and appearance well groomed    LABS:     All pertinent notes, labs, and images personally reviewed by me.     A/P:  Encounter Diagnoses   Name Primary?    Acromegaly (H) Yes    History of partial thyroidectomy     Prediabetes      Comments:   Reviewed health history, pituitary, and thyroid issues.  Difficult to understand the Somatuline treatments at her OBGYN clinic without medical records  Reviewed and interpreted tests that I previously ordered.   Ordered appropriate tests for the endocrinology disease management.    Management options discussed and implemented after shared medical decision making with the patient.  Acromegaly problem is chronic-stable     Plan:  Reviewed general issues with acromegaly diagnosis and management  Discussed lab tests used to assess patient pituitary-axis hormone levels  We have discussed the previous pituitary MRI imaging  procedure  Reviewed treatment options with the somatostatin and ddAVP medication use  Reviewed lab testing to screen for hypopituitarism and hypothyroidism     Recommend:  Reviewed the pituitary disease management plan  Continue the Somatuline 120 mg Q 2 month injection dosing at [facility]   Plan next dose soon   Contact our office if questions about Somatuline dosing or Rx  Continue current ddAVP spray QAM but consider change to BID dosing  Needs repeat endocrine lab testing in 9/2024   Testing at [facility]   Printed lab orders (x2) given to patient, diagnoses and our fax number added  No head MRI imaging needed at this time.  Monitor for symptom changes  Will summarize results and recommendations when lab results available    Keep regular general medicine, orthopedic, and GYN appointments  Needs further evaluation of the leg edema and/or vascular medicine consultation  Addressed patient questions today     The longitudinal plan of care for the endocrine problem(s) were addressed during this visit.  Due to added complexity of care,   we will continue to support the patient and the subsequent management of this condition with ongoing continuity of care.    There are no Patient Instructions on file for this visit.    Future labs ordered today:   Orders Placed This Encounter   Procedures    TSH    T4 free    Cortisol    Insulin Growth Factor 1 by Immunoassay    Hemoglobin A1c    Prolactin     Radiology/Consults ordered today: None    Total time spent on day of encounter:  24 min    Follow-up:  early 2/2025, Return    VARGAS De Luna MD, MS  Endocrinology  [facility]

## 2024-08-19 ENCOUNTER — TELEPHONE (OUTPATIENT)
Dept: ENDOCRINOLOGY | Facility: CLINIC | Age: 64
End: 2024-08-19
Payer: COMMERCIAL

## 2024-08-19 DIAGNOSIS — E22.0 ACROMEGALY (H): ICD-10-CM

## 2024-08-19 RX ORDER — LANREOTIDE ACETATE 120 MG/.5ML
INJECTION SUBCUTANEOUS
Qty: 0.5 ML | Refills: 6 | Status: SHIPPED | OUTPATIENT
Start: 2024-08-19

## 2024-08-19 NOTE — TELEPHONE ENCOUNTER
Requested Prescriptions   Pending Prescriptions Disp Refills    SOMATULINE DEPOT 120 MG/0.5ML injection 0.5 mL 6     Sig: INJECT 120 MG UNDER THE SKIN EVERY 2 MONTHS AS DIRECTED       There is no refill protocol information for this order        Last Written Prescription Date:  8/18/23  Last Fill Quantity: 0.5mL,  # refills: 6   Last office visit: 7/25/2024 ; last virtual visit: 2/23/2023 with prescribing provider:  Dr De Luna   Future Office Visit:  2/13/25    Refill sent. PA automatically started with refill. LVM with update for patient.    Taj Moser RN

## 2024-08-19 NOTE — TELEPHONE ENCOUNTER
[facility] Call Center    Phone Message    May a detailed message be left on voicemail: yes     Reason for Call: Medication Refill Request    Has the patient contacted the pharmacy for the refill? Yes   Name of medication being requested: SOMATULINE DEPOT 120 MG/0.5ML SOLN injection   Provider who prescribed the medication: Dr. De Luna  Pharmacy: [address]   Date medication is needed: as soon as able, per patient she states a prior auth       Action Taken: Message routed to:  [facility] endo    Travel Screening: Not Applicable     Date of Service:

## 2024-09-20 ENCOUNTER — TRANSFERRED RECORDS (OUTPATIENT)
Dept: HEALTH INFORMATION MANAGEMENT | Facility: CLINIC | Age: 64
End: 2024-09-20

## 2024-09-20 LAB — HBA1C MFR BLD: 6.5 % (ref 4.8–5.6)

## 2024-10-21 DIAGNOSIS — E23.2 DI (DIABETES INSIPIDUS) (H): ICD-10-CM

## 2024-10-21 RX ORDER — DESMOPRESSIN ACETATE 0.1 MG/ML
SOLUTION NASAL
Qty: 5 ML | Refills: 5 | Status: SHIPPED | OUTPATIENT
Start: 2024-10-21

## 2024-10-21 NOTE — TELEPHONE ENCOUNTER
Last Written Prescription Date:  12/21/23  Last Fill Quantity: 5,  # refills: 5   Last office visit: 7/25/2024 ; last virtual visit: 2/23/2023 with prescribing provider:  Dr. De Luna   Future Office Visit: 2/13/25     Requested Prescriptions   Pending Prescriptions Disp Refills    desmopressin (DDAVP) 0.01 % spray [Pharmacy Med Name: DESMOPRESSIN 0.1 MG/ML SPRA 0.01 Solution] 5 mL 5     Sig: USE 1 SPRAY TO NOSTRILS TWICE PER DAY AS DIRECTED       There is no refill protocol information for this order        Refills sent  Saida Sanders RN

## 2025-02-13 ENCOUNTER — OFFICE VISIT (OUTPATIENT)
Dept: ENDOCRINOLOGY | Facility: CLINIC | Age: 65
End: 2025-02-13
Payer: COMMERCIAL

## 2025-02-13 VITALS
WEIGHT: 245 LBS | BODY MASS INDEX: 36.18 KG/M2 | SYSTOLIC BLOOD PRESSURE: 125 MMHG | DIASTOLIC BLOOD PRESSURE: 84 MMHG | HEART RATE: 64 BPM

## 2025-02-13 DIAGNOSIS — E11.9 TYPE 2 DIABETES MELLITUS WITHOUT COMPLICATION, WITHOUT LONG-TERM CURRENT USE OF INSULIN (H): ICD-10-CM

## 2025-02-13 DIAGNOSIS — E23.2 DI (DIABETES INSIPIDUS): ICD-10-CM

## 2025-02-13 DIAGNOSIS — E89.0 HISTORY OF PARTIAL THYROIDECTOMY: ICD-10-CM

## 2025-02-13 DIAGNOSIS — E22.0 ACROMEGALY (H): Primary | ICD-10-CM

## 2025-02-13 RX ORDER — DESMOPRESSIN ACETATE 0.1 MG/ML
SOLUTION NASAL
Qty: 5 ML | Refills: 5 | Status: SHIPPED | OUTPATIENT
Start: 2025-02-13

## 2025-02-13 NOTE — PROGRESS NOTES
Recent issues:  Endocrinology followup evaluation  Previous right LISA 1/2021, then 2/2021 right leg DVT's and bilateral PE's in 2/2021; subsequent left hip LISA 1/13/22  Chronic skin rash on inner right ankle since 10/2024, has seen PCP and dermatologist... cause unclear  Last Somatuline dose 1/2/25, some confusion on med copay and PAP coverage...         Pituitary:  1997. 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 Tod Loving/neurosurgery  10/16/97 TSS pituitary surgery at [facility]     Devoloped postop diabetes insipidus (DI)  Postop mild persistent elevation with IGF-1 level, normalized with Octreotide treatment  Has taken Somatuline (somatostatin), ddAVP for treatment  Head MRI imaging includes:  2/10/10 MRI:              Postop changes after TSS              No evidence for recurrent tumor  9/24/13 MRI:              Postop changes following pituitary surgery              Enhancing tissues 0.45 x 1.3 x 1.4 cm              No clear evidence of recurrent tumor  12/19/16 MRI:              Postop changes              Enhancing tissues 0.65 x 0.8 x ~1.4 cm  9/8/22 Head MRI ([facility]):  Postsurgical changes related to transsphenoidal, transsphenoidal pituitary  adenoma resection.   Stable appearance to the sella turcica.   No evident increasing  enhancement on nondedicated sequences.   No intracranial mass, abnormal mass-effect or midline shift identified.   No abnormal enhancement.     Other imaging:  Pelvis XRays:               Osteochondroma bone changes  5/23/14 Abdominal U/S:              GB unremarkable without cholethiasis or GB wall thickening  3/2010 Colonoscopy:              Polyp removed, benign tissue  3/2015 Colonoscopy:              No polyps reported     ~10/2019. Jody estrogen patch discontinued  Had seen Dr. Baron  Zina/[facility]    Previous IGF-1 levels include:  4/5/19  256  10/11/19 308  10/2/20 165  8/27/21 234 ng/mL (nl )  8/30/22 201 ng/mL (nl )  1/5/24  205 ng/mL    Recent [facility] labs include:  Lab Results   Component Value Date    PROLACTIN 7 10/02/2020    HGH 0.5 10/02/2020    MARCOS 5.5 10/02/2020    TSH 1.15 10/02/2020    T4 0.93 10/02/2020      Current medications include: Somatuline 120 mg subcutaneous injections to hip area, every Q2 mo cycle       Medication shipments/dosing at [facility], last dose 1/2/25                 ddAVP 1-spray each morning         Thyroid:  History of multinodular goiter  Surgical evaluation at [facility]  7/2005. Left thyroid lobectomy  Path:  Benign adenomas  No evidence for postop hypothyroidism  No previous treatment with thyroid medication  2/24/21 Thyroid U/S:   Right lobe 5.3 x 3.1 x 3 cm and left lobe surgically absent   Several small (<1 cm) nodules throughout right thyroid lobe    Previous labs from other clinics include:  12/19/17  IGF1 210 ng/mL (nl ), GH 0.9 ng/mL (nl <7.1), glucose 93 mg/dl, prolactin 6.0 ng/mL, TSH 1.77 uIU/mL, FT4 0.95 ng/dL  9/27/21 [facility] 256 ng/mL (nl )  8/30/22 TSH 1.9 mIU/mL, FT4 0.92 ng/dL, hgbA1c 6.4%, cortisol 5.4 ng/dL, Ca 9.4 mg/dl  Recent [facility] labs include:  Lab Results   Component Value Date    TSH 1.15 10/02/2020    T4 0.93 10/02/2020           , lives in [place], 2 children Taj and Olivia.  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 Saw Anderson/[facility] for general medicine evaluations.  Also sees Dr. Lucina Bales and also Lucina NAPOLES/[facility]       PMH/PSH:  Past Medical History:   Diagnosis Date    Acromegaly (H)     Breast lump     Cataract     Diabetes insipidus     DVT (deep venous thrombosis) (H) 02/2021    right leg    Kidney infection     Multiple thyroid nodules     TOBY (obstructive sleep apnea)      Osteochondroma     Pulmonary embolus (H) 02/2021    bilateral    Skin rash     inner right ankle     Past Surgical History:   Procedure Laterality Date    HYSTERECTOMY TOTAL ABDOMINAL  10/2007    PARTIAL THYROIDECTOMY Left 07/2005    PITUITARY SURGERY  10/1997    TSS pituitary surgery       Family Hx:  No family history on file.      Social Hx:  Social History     Socioeconomic History    Marital status:      Spouse name: Not on file    Number of children: Not on file    Years of education: Not on file    Highest education level: Not on file   Occupational History    Not on file   Tobacco Use    Smoking status: Never    Smokeless tobacco: Never   Substance and Sexual Activity    Alcohol use: Yes    Drug use: No    Sexual activity: Not on file   Other Topics Concern    Not on file   Social History Narrative    Not on file     Social Drivers of Health     Financial Resource Strain: Not on file   Food Insecurity: Not on file   Transportation Needs: Not on file   Physical Activity: Not on file   Stress: Not on file   Social Connections: Not on file   Interpersonal Safety: Not At Risk (1/13/2025)    Received from [facility]    Humiliation, Afraid, Rape, and Kick questionnaire     Fear of Current or Ex-Partner: No     Emotionally Abused: No     Physically Abused: No     Sexually Abused: No   Housing Stability: Not on file          MEDICATIONS:  has a current medication list which includes the following prescription(s): amlodipine, cyanocobalamin, desmopressin, escitalopram, hydrochlorothiazide, multivitamin w/minerals, rosuvastatin, somatuline depot, cholecalciferol, omega-3 acid ethyl esters, and rivaroxaban anticoagulant.       ROS: 10 point ROS neg other than the symptoms noted above in the HPI.     GENERAL: some fatigue, wt stable; denies fevers, chills, night sweats.    HEENT: no dysphagia, odonophagia, diplopia, neck pain  THYROID:  no apparent hyper or hypothyroid  symptoms  CV: no chest pain, pressure, palpitations  LUNGS: no SOB, HOPPER, cough, wheezing   ABDOMEN: no diarrhea, constipation, abdominal pain  EXTREMITIES: right foot/ankle/lower leg edema  NEUROLOGY: no headaches, denies changes in vision, tingling, extremitiy numbness   MSK: mid-low back pains, also some knee pains; denies muscle weakness  SKIN: no rashes or lesions  :  nocturia 2x/night, also ncreased urination if no ddAVP use; no menses since JOANN/BSO 10/2007  PSYCH:  stable mood, no significant anxiety or depression  ENDOCRINE: no heat or cold intolerance      Physical Exam   VS: /84   Pulse 64   Wt 111.1 kg (245 lb)   BMI 36.18 kg/m    GENERAL: AXOX3, NAD, well dressed, answering questions appropriately, appears stated age.  ENT: prominent mandible and facial bones; no nose swelling or nasal discharge, mouth redness or gum changes.  EYES: eyes grossly normal to inspection, conjunctivae and sclerae normal, no exophthalmos or proptosis  THYROID:  low-horizontal anterior neck scar; no apparent nodules or goiter  CV:  RRR without murmurs  LUNGS: no audible wheeze, cough or visible cyanosis, or increased work of breathing  ABDOMEN: abdomen obese size  EXTREMITIES: irregular dark rash ~6 in. diameter at inner right ankle, deviated alignment right distal index finger, some right ankle area edema, large hands  NEUROLOGY: CN grossly intact, no tremors  MSK: grossly intact  SKIN:  scalp hair thinning; no apparent skin lesions, rash, or edema with visualized skin appearance  PSYCH: mentation appears normal, affect normal/bright, judgement and insight intact,   normal speech and appearance well groomed    LABS:     All pertinent notes, labs, and images personally reviewed by me.       A/P:  Encounter Diagnoses   Name Primary?    Acromegaly (H) Yes    DI (diabetes insipidus)     History of partial thyroidectomy     Type 2 diabetes mellitus without complication, without long-term current use of insulin (H)         Comments:   Reviewed health history, pituitary, and thyroid issues.  Difficult to understand the Somatuline treatments at her [facility] without medical records  Reviewed and interpreted tests that I previously ordered.   Ordered appropriate tests for the endocrinology disease management.    Management options discussed and implemented after shared medical decision making with the patient.  Acromegaly problem is chronic-stable     Plan:  Reviewed general issues with acromegaly diagnosis and management  Discussed lab tests used to assess patient pituitary-axis hormone levels  We have discussed the previous pituitary MRI imaging procedure  Reviewed treatment options with the somatostatin and ddAVP medication use  Reviewed lab testing to screen for hypopituitarism and hypothyroidism     Recommend:  Reviewed the pituitary disease management plan  Continue the Somatuline 120 mg Q 2 month though consider dose reduction options  She has had injection dosing at [facility]   Plan next dose soon, if covered by insurance or the PAP   Contact our office if questions about Somatuline dosing or Rx  Continue current ddAVP spray QAM but consider change to BID dosing  Needs repeat endocrine lab testing in 2/2025 and 8/2025   Testing at [facility] lab   Check IGF-1, prolactin, FT4, hgbA1c   Printed lab orders (x2) given to patient, diagnoses and our fax number added  No head MRI imaging needed at this time.  Monitor for symptom changes  Will summarize results and recommendations when lab results available    While hgbA1c indicates mild T2DM, would consider pursuing med treatment with a weekly GLP1RA medication   See Dr. Larios to discuss this idea, since would need regular follow-up evaluations with dose titration    Keep regular general medicine, orthopedic, and GYN appointments  Needs further evaluation right ankle rash with dermatologist  Addressed patient questions today     The longitudinal plan of  care for the endocrine problem(s) were addressed during this visit.  Due to added complexity of care,   we will continue to support the patient and the subsequent management of this condition with ongoing continuity of care.    There are no Patient Instructions on file for this visit.    Future labs ordered today: No orders of the defined types were placed in this encounter.   Lab orders given to patient, as noted    Radiology/Consults ordered today: None    Total time spent on day of encounter:  41 min    Follow-up:  8/28/25, Return    VARGAS De Luna MD, MS  Endocrinology  [facility]    CC:  SILVESTRE Biswas

## 2025-08-01 ENCOUNTER — TELEPHONE (OUTPATIENT)
Dept: ENDOCRINOLOGY | Facility: CLINIC | Age: 65
End: 2025-08-01
Payer: COMMERCIAL

## 2025-08-01 DIAGNOSIS — E22.0 ACROMEGALY (H): Primary | ICD-10-CM
